# Patient Record
Sex: FEMALE | Race: WHITE | Employment: FULL TIME | URBAN - METROPOLITAN AREA
[De-identification: names, ages, dates, MRNs, and addresses within clinical notes are randomized per-mention and may not be internally consistent; named-entity substitution may affect disease eponyms.]

---

## 2017-02-21 ENCOUNTER — PATIENT OUTREACH (OUTPATIENT)
Dept: FAMILY MEDICINE CLINIC | Facility: CLINIC | Age: 57
End: 2017-02-21

## 2017-02-21 DIAGNOSIS — E11.9 TYPE 2 DIABETES MELLITUS WITHOUT COMPLICATION, WITHOUT LONG-TERM CURRENT USE OF INSULIN (HCC): Primary | ICD-10-CM

## 2017-03-15 ENCOUNTER — OFFICE VISIT (OUTPATIENT)
Dept: FAMILY MEDICINE CLINIC | Facility: CLINIC | Age: 57
End: 2017-03-15

## 2017-03-15 VITALS
DIASTOLIC BLOOD PRESSURE: 80 MMHG | HEART RATE: 94 BPM | TEMPERATURE: 98 F | BODY MASS INDEX: 32.1 KG/M2 | HEIGHT: 61 IN | RESPIRATION RATE: 16 BRPM | SYSTOLIC BLOOD PRESSURE: 120 MMHG | WEIGHT: 170 LBS | OXYGEN SATURATION: 97 %

## 2017-03-15 DIAGNOSIS — M17.12 ARTHRITIS OF LEFT KNEE: Primary | ICD-10-CM

## 2017-03-15 PROCEDURE — 20610 DRAIN/INJ JOINT/BURSA W/O US: CPT | Performed by: FAMILY MEDICINE

## 2017-03-15 PROCEDURE — 99214 OFFICE O/P EST MOD 30 MIN: CPT | Performed by: FAMILY MEDICINE

## 2017-03-15 RX ORDER — NAPROXEN AND ESOMEPRAZOLE MAGNESIUM 500; 20 MG/1; MG/1
500 TABLET, DELAYED RELEASE ORAL 2 TIMES DAILY
Qty: 60 TABLET | Refills: 0 | COMMUNITY
Start: 2017-03-15 | End: 2017-04-14

## 2017-03-15 NOTE — PROGRESS NOTES
Patient presents with:  Imm/Inj       HPI: L knee  has been hurting for few years, worse for last few months. Dull  in character. Radiation to the lower leg . 5-7/10. No weakness. No numbness or tingling. Worsening.  Movement makes it worse and rest makes Surgical History    COLONOSCOPY  5/06; 10/11    Comment internal hemorrhoids    HERNIA SURGERY  [de-identified] years old    Comment left inguinal  repair    HERNIA SURGERY  7/20    Comment umbilical hernia repair    HYSTERECTOMY  6/05    Comment MEENU-BSO    CALCIUM S dx age 62's   • Breast Cancer Paternal Cousin Female 61     approx age   • Breast Cancer Paternal Cousin Female 54        Smoking Status: Never Smoker                      Smokeless Status: Never Used                        Alcohol Use: Yes           0.5 o One  [id number]  [id number]  Patient's status: tolerated well, no complications.   Dressing: Bandagee applied  Post-procedure, patient tolerated it well, no complications, no strenuous activities for one week, f/u in one MONTH            A/P  (M19.90) Ar

## 2017-06-12 RX ORDER — OMEPRAZOLE 40 MG/1
CAPSULE, DELAYED RELEASE ORAL
Qty: 90 CAPSULE | Refills: 1 | Status: SHIPPED | OUTPATIENT
Start: 2017-06-12 | End: 2017-08-19

## 2017-07-07 ENCOUNTER — APPOINTMENT (OUTPATIENT)
Dept: LAB | Age: 57
End: 2017-07-07
Attending: FAMILY MEDICINE
Payer: COMMERCIAL

## 2017-07-07 DIAGNOSIS — E11.9 TYPE 2 DIABETES MELLITUS WITHOUT COMPLICATION, WITHOUT LONG-TERM CURRENT USE OF INSULIN (HCC): ICD-10-CM

## 2017-07-07 LAB
ALBUMIN SERPL-MCNC: 3.5 G/DL (ref 3.5–4.8)
ALP LIVER SERPL-CCNC: 95 U/L (ref 46–118)
ALT SERPL-CCNC: 29 U/L (ref 14–54)
AST SERPL-CCNC: 13 U/L (ref 15–41)
BILIRUB SERPL-MCNC: 0.3 MG/DL (ref 0.1–2)
BUN BLD-MCNC: 26 MG/DL (ref 8–20)
CALCIUM BLD-MCNC: 8.6 MG/DL (ref 8.3–10.3)
CHLORIDE: 105 MMOL/L (ref 101–111)
CHOLEST SMN-MCNC: 220 MG/DL (ref ?–200)
CO2: 27 MMOL/L (ref 22–32)
CREAT BLD-MCNC: 0.82 MG/DL (ref 0.55–1.02)
EST. AVERAGE GLUCOSE BLD GHB EST-MCNC: 154 MG/DL (ref 68–126)
GLUCOSE BLD-MCNC: 150 MG/DL (ref 70–99)
HBA1C MFR BLD HPLC: 7 % (ref ?–5.7)
HDLC SERPL-MCNC: 49 MG/DL (ref 45–?)
HDLC SERPL: 4.49 {RATIO} (ref ?–4.44)
LDLC SERPL CALC-MCNC: 138 MG/DL (ref ?–130)
M PROTEIN MFR SERPL ELPH: 7.2 G/DL (ref 6.1–8.3)
NONHDLC SERPL-MCNC: 171 MG/DL (ref ?–130)
POTASSIUM SERPL-SCNC: 4.2 MMOL/L (ref 3.6–5.1)
SODIUM SERPL-SCNC: 139 MMOL/L (ref 136–144)
TRIGLYCERIDES: 165 MG/DL (ref ?–150)
VLDL: 33 MG/DL (ref 5–40)

## 2017-07-07 PROCEDURE — 80053 COMPREHEN METABOLIC PANEL: CPT

## 2017-07-07 PROCEDURE — 36415 COLL VENOUS BLD VENIPUNCTURE: CPT

## 2017-07-07 PROCEDURE — 83036 HEMOGLOBIN GLYCOSYLATED A1C: CPT

## 2017-07-07 PROCEDURE — 80061 LIPID PANEL: CPT

## 2017-07-10 ENCOUNTER — TELEPHONE (OUTPATIENT)
Dept: FAMILY MEDICINE CLINIC | Facility: CLINIC | Age: 57
End: 2017-07-10

## 2017-07-10 DIAGNOSIS — E11.9 CONTROLLED TYPE 2 DIABETES MELLITUS WITHOUT COMPLICATION, WITHOUT LONG-TERM CURRENT USE OF INSULIN (HCC): Primary | ICD-10-CM

## 2017-07-10 NOTE — TELEPHONE ENCOUNTER
----- Message from Angel Joyce MD sent at 7/9/2017  5:36 PM CDT -----  Sugar is little worse, ok to start Metformin 500mg BID will help the weight and glucose, HgBA1C in 3 months. This is our Chevis Chafe from Radiology.

## 2017-07-10 NOTE — TELEPHONE ENCOUNTER
I called pt at [phone] and verified 2 patient identifiers: name & . I discussed results and recommendations at length with patient. All orders placed. All questions answered.

## 2017-07-13 NOTE — TELEPHONE ENCOUNTER
From: Jazmin Junior  Sent: 7/12/2017 2:09 PM CDT  Subject: Medication Renewal Request    Piotr Maribel.  [place] would like a refill of the following medications:  MetFORMIN HCl 500 MG Oral Tab Gilberto Young MD]    Preferred pharmacy: [facility]AC[facility] #[id number] - [place]

## 2017-07-29 ENCOUNTER — HOSPITAL ENCOUNTER (OUTPATIENT)
Dept: GENERAL RADIOLOGY | Age: 57
Discharge: HOME | End: 2017-07-29
Attending: FAMILY MEDICINE
Payer: COMMERCIAL

## 2017-07-29 ENCOUNTER — OFFICE VISIT (OUTPATIENT)
Dept: FAMILY MEDICINE CLINIC | Facility: CLINIC | Age: 57
End: 2017-07-29

## 2017-07-29 VITALS
RESPIRATION RATE: 18 BRPM | OXYGEN SATURATION: 97 % | BODY MASS INDEX: 32.1 KG/M2 | SYSTOLIC BLOOD PRESSURE: 138 MMHG | DIASTOLIC BLOOD PRESSURE: 88 MMHG | HEART RATE: 84 BPM | HEIGHT: 61 IN | WEIGHT: 170 LBS

## 2017-07-29 DIAGNOSIS — S99.922A TOE INJURY, LEFT, INITIAL ENCOUNTER: ICD-10-CM

## 2017-07-29 DIAGNOSIS — M79.671 RIGHT FOOT PAIN: ICD-10-CM

## 2017-07-29 DIAGNOSIS — E11.9 DIABETES MELLITUS WITHOUT COMPLICATION (HCC): Primary | ICD-10-CM

## 2017-07-29 DIAGNOSIS — M25.561 ACUTE PAIN OF RIGHT KNEE: ICD-10-CM

## 2017-07-29 PROCEDURE — 73630 X-RAY EXAM OF FOOT: CPT | Performed by: FAMILY MEDICINE

## 2017-07-29 PROCEDURE — 73660 X-RAY EXAM OF TOE(S): CPT | Performed by: FAMILY MEDICINE

## 2017-07-29 PROCEDURE — 99214 OFFICE O/P EST MOD 30 MIN: CPT | Performed by: FAMILY MEDICINE

## 2017-07-29 PROCEDURE — 73560 X-RAY EXAM OF KNEE 1 OR 2: CPT | Performed by: FAMILY MEDICINE

## 2017-07-29 RX ORDER — CHLORZOXAZONE 750 MG/1
1 TABLET ORAL NIGHTLY
Qty: 10 TABLET | Refills: 0 | COMMUNITY
Start: 2017-07-29 | End: 2017-08-08

## 2017-07-29 RX ORDER — IBUPROFEN AND FAMOTIDINE 26.6; 8 MG/1; MG/1
1 TABLET, FILM COATED ORAL 2 TIMES DAILY
Qty: 27 TABLET | Refills: 0 | COMMUNITY
Start: 2017-07-29 | End: 2017-08-28

## 2017-07-29 RX ORDER — LANCETS
EACH MISCELLANEOUS
Qty: 100 EACH | Refills: 1 | Status: SHIPPED | OUTPATIENT
Start: 2017-07-29 | End: 2017-08-02

## 2017-07-29 NOTE — PROGRESS NOTES
Patient presents with:  Knee Pain: RT knee pain, worseing over the last month. Foot Pain: RT foot, lateral side pain. DM f/u. HPI: L knee  has been hurting for 21 days . Dull  in character. Radiation to the back  . 8/10. No weakness.   No numbness or ACTINIC KERATOSIS/dysplastic nevi 10/8/2008    Dr. Sapna Garcias follows.    • ANEMIA    • Depression    • Esophageal reflux    • GLUCOSE INTOLERANCE 10/6/2008    HISTORY OF GESTATIONAL DIABETES   • Hashimoto's thyroiditis 1/26/2010   • Internal hemorrhoids withou Hypertension Father    • Diabetes Father    • Stroke Father    • Lipids Father    • Thyroid Disorder Mother      hypothyroidism   • Gastro-Intestinal Disorder Mother      ischemic colitis   • Hypertension Mother    • Diabetes Paternal Grandmother    • Diab No palpable Isidro's cyst., Lachman's test, negative., Anterior drawer test, negative., Posterior drawer test, negative., Lateral collateral ligament intact., Medial collateral ligament intact., No lateral joint line tenderness., No medial joint

## 2017-07-31 ENCOUNTER — TELEPHONE (OUTPATIENT)
Dept: CASE MANAGEMENT | Age: 57
End: 2017-07-31

## 2017-07-31 ENCOUNTER — TELEPHONE (OUTPATIENT)
Dept: FAMILY MEDICINE CLINIC | Facility: CLINIC | Age: 57
End: 2017-07-31

## 2017-07-31 NOTE — TELEPHONE ENCOUNTER
----- Message from Hazel Toro MD sent at 7/31/2017  9:02 AM CDT -----  Body typing, pt has small fracture, ok to see Dr. Harper Carlson for boot if pt wishes.

## 2017-07-31 NOTE — TELEPHONE ENCOUNTER
Spoke to pt regarding her results below. Pt states understanding. Provided information for Dr. Raven Rain.

## 2017-07-31 NOTE — TELEPHONE ENCOUNTER
----- Message from Rox Wade MD sent at 7/31/2017  9:03 AM CDT -----  Small join effusion, no OA, ok to take Duexis.

## 2017-08-02 NOTE — TELEPHONE ENCOUNTER
[facility] and spoke with Thierno Zavala. She states that Ashlyn Lu is not covered under patient's insurance. She is requesting that we change patient's test supplies to One Touch Ultra. Please approve or deny new testing supplies for patient.   Thank yo

## 2017-08-03 RX ORDER — LANCETS 33 GAUGE
EACH MISCELLANEOUS
Qty: 100 EACH | Refills: 3 | Status: ON HOLD | OUTPATIENT
Start: 2017-08-03 | End: 2018-07-27

## 2017-08-03 RX ORDER — BLOOD-GLUCOSE METER
EACH MISCELLANEOUS
Qty: 1 KIT | Refills: 0 | Status: SHIPPED | OUTPATIENT
Start: 2017-08-03

## 2017-08-13 ENCOUNTER — HOSPITAL ENCOUNTER (OUTPATIENT)
Dept: MRI IMAGING | Age: 57
Discharge: HOME | End: 2017-08-13
Attending: FAMILY MEDICINE
Payer: COMMERCIAL

## 2017-08-13 DIAGNOSIS — M25.561 ACUTE PAIN OF RIGHT KNEE: ICD-10-CM

## 2017-08-13 PROCEDURE — 73721 MRI JNT OF LWR EXTRE W/O DYE: CPT | Performed by: FAMILY MEDICINE

## 2017-08-19 NOTE — TELEPHONE ENCOUNTER
LF: 6/12/2017  LOV: 7/29/2017    Pending Prescriptions Disp Refills    OMEPRAZOLE 40 MG Oral Capsule Delayed Release [Pharmacy Med Name: OMEPRAZOLE 40 MG CAPSULE DR] 90 capsule 1     Sig: TAKE ONE CAPSULE BY MOUTH EVERY DAY         Please approve or deny R

## 2017-08-21 RX ORDER — OMEPRAZOLE 40 MG/1
CAPSULE, DELAYED RELEASE ORAL
Qty: 90 CAPSULE | Refills: 1 | Status: SHIPPED | OUTPATIENT
Start: 2017-08-21 | End: 2019-02-06

## 2017-09-06 PROBLEM — M94.261 CHONDROMALACIA OF RIGHT KNEE: Status: ACTIVE | Noted: 2017-09-06

## 2017-09-06 PROBLEM — S83.231A COMPLEX TEAR OF MEDIAL MENISCUS OF RIGHT KNEE AS CURRENT INJURY, INITIAL ENCOUNTER: Status: ACTIVE | Noted: 2017-09-06

## 2017-10-18 PROBLEM — S83.231D COMPLEX TEAR OF MEDIAL MENISCUS OF RIGHT KNEE AS CURRENT INJURY, SUBSEQUENT ENCOUNTER: Status: ACTIVE | Noted: 2017-09-06

## 2017-10-18 PROBLEM — M71.22 POPLITEAL CYST, LEFT: Status: ACTIVE | Noted: 2017-10-18

## 2017-11-02 ENCOUNTER — PATIENT MESSAGE (OUTPATIENT)
Dept: FAMILY MEDICINE CLINIC | Facility: CLINIC | Age: 57
End: 2017-11-02

## 2017-11-02 DIAGNOSIS — Z12.31 ENCOUNTER FOR SCREENING MAMMOGRAM FOR MALIGNANT NEOPLASM OF BREAST: Primary | ICD-10-CM

## 2017-11-03 NOTE — TELEPHONE ENCOUNTER
From: Laurence Pat  To: Leah Bansal MD  Sent: 11/2/2017 11:05 AM CDT  Subject: Other    Will you please put in an order for a screening mammogram for me? Thank you !

## 2017-11-06 ENCOUNTER — HOSPITAL ENCOUNTER (OUTPATIENT)
Dept: MAMMOGRAPHY | Age: 57
Discharge: HOME | End: 2017-11-06
Attending: OBSTETRICS & GYNECOLOGY
Payer: COMMERCIAL

## 2017-11-06 DIAGNOSIS — Z12.31 ENCOUNTER FOR SCREENING MAMMOGRAM FOR MALIGNANT NEOPLASM OF BREAST: ICD-10-CM

## 2017-11-06 PROCEDURE — 77067 SCR MAMMO BI INCL CAD: CPT | Performed by: FAMILY MEDICINE

## 2017-11-11 DIAGNOSIS — E11.9 DIABETES MELLITUS WITHOUT COMPLICATION (HCC): ICD-10-CM

## 2017-12-06 ENCOUNTER — APPOINTMENT (OUTPATIENT)
Dept: LAB | Age: 57
End: 2017-12-06
Attending: FAMILY MEDICINE
Payer: COMMERCIAL

## 2017-12-06 ENCOUNTER — HOSPITAL ENCOUNTER (OUTPATIENT)
Dept: ULTRASOUND IMAGING | Age: 57
Discharge: HOME | End: 2017-12-06
Attending: OTOLARYNGOLOGY
Payer: COMMERCIAL

## 2017-12-06 DIAGNOSIS — E04.2 NONTOXIC MULTINODULAR GOITER: ICD-10-CM

## 2017-12-06 DIAGNOSIS — E11.9 CONTROLLED TYPE 2 DIABETES MELLITUS WITHOUT COMPLICATION, WITHOUT LONG-TERM CURRENT USE OF INSULIN (HCC): ICD-10-CM

## 2017-12-06 PROCEDURE — 36415 COLL VENOUS BLD VENIPUNCTURE: CPT

## 2017-12-06 PROCEDURE — 76536 US EXAM OF HEAD AND NECK: CPT | Performed by: OTOLARYNGOLOGY

## 2017-12-06 PROCEDURE — 83036 HEMOGLOBIN GLYCOSYLATED A1C: CPT

## 2017-12-19 ENCOUNTER — MED REC SCAN ONLY (OUTPATIENT)
Dept: FAMILY MEDICINE CLINIC | Facility: CLINIC | Age: 57
End: 2017-12-19

## 2017-12-19 ENCOUNTER — TELEPHONE (OUTPATIENT)
Dept: FAMILY MEDICINE CLINIC | Facility: CLINIC | Age: 57
End: 2017-12-19

## 2017-12-19 ENCOUNTER — LAB ENCOUNTER (OUTPATIENT)
Dept: LAB | Age: 57
End: 2017-12-19
Attending: FAMILY MEDICINE
Payer: COMMERCIAL

## 2017-12-19 ENCOUNTER — OFFICE VISIT (OUTPATIENT)
Dept: FAMILY MEDICINE CLINIC | Facility: CLINIC | Age: 57
End: 2017-12-19

## 2017-12-19 VITALS
OXYGEN SATURATION: 100 % | SYSTOLIC BLOOD PRESSURE: 120 MMHG | HEART RATE: 78 BPM | WEIGHT: 168 LBS | RESPIRATION RATE: 18 BRPM | HEIGHT: 61 IN | DIASTOLIC BLOOD PRESSURE: 82 MMHG | BODY MASS INDEX: 31.72 KG/M2 | TEMPERATURE: 99 F

## 2017-12-19 DIAGNOSIS — E11.9 TYPE II DIABETES MELLITUS, WELL CONTROLLED (HCC): ICD-10-CM

## 2017-12-19 DIAGNOSIS — Z13.29 SCREENING FOR ENDOCRINE, NUTRITIONAL, METABOLIC AND IMMUNITY DISORDER: ICD-10-CM

## 2017-12-19 DIAGNOSIS — Z00.00 ROUTINE GENERAL MEDICAL EXAMINATION AT A HEALTH CARE FACILITY: Primary | ICD-10-CM

## 2017-12-19 DIAGNOSIS — E11.9 DIABETES MELLITUS WITHOUT COMPLICATION (HCC): ICD-10-CM

## 2017-12-19 DIAGNOSIS — Z13.0 SCREENING FOR ENDOCRINE, NUTRITIONAL, METABOLIC AND IMMUNITY DISORDER: ICD-10-CM

## 2017-12-19 DIAGNOSIS — Z13.228 SCREENING FOR ENDOCRINE, NUTRITIONAL, METABOLIC AND IMMUNITY DISORDER: ICD-10-CM

## 2017-12-19 DIAGNOSIS — Z00.00 ROUTINE GENERAL MEDICAL EXAMINATION AT A HEALTH CARE FACILITY: ICD-10-CM

## 2017-12-19 DIAGNOSIS — Z13.21 SCREENING FOR ENDOCRINE, NUTRITIONAL, METABOLIC AND IMMUNITY DISORDER: ICD-10-CM

## 2017-12-19 DIAGNOSIS — Z23 NEED FOR PNEUMOCOCCAL VACCINATION: ICD-10-CM

## 2017-12-19 PROCEDURE — 90732 PPSV23 VACC 2 YRS+ SUBQ/IM: CPT | Performed by: FAMILY MEDICINE

## 2017-12-19 PROCEDURE — 85025 COMPLETE CBC W/AUTO DIFF WBC: CPT

## 2017-12-19 PROCEDURE — 99396 PREV VISIT EST AGE 40-64: CPT | Performed by: FAMILY MEDICINE

## 2017-12-19 PROCEDURE — 82306 VITAMIN D 25 HYDROXY: CPT

## 2017-12-19 PROCEDURE — 80061 LIPID PANEL: CPT

## 2017-12-19 PROCEDURE — 84443 ASSAY THYROID STIM HORMONE: CPT

## 2017-12-19 PROCEDURE — 90471 IMMUNIZATION ADMIN: CPT | Performed by: FAMILY MEDICINE

## 2017-12-19 PROCEDURE — 80053 COMPREHEN METABOLIC PANEL: CPT

## 2017-12-19 PROCEDURE — 36415 COLL VENOUS BLD VENIPUNCTURE: CPT

## 2017-12-19 RX ORDER — OMEPRAZOLE 40 MG/1
40 CAPSULE, DELAYED RELEASE ORAL 2 TIMES DAILY
Qty: 180 CAPSULE | Refills: 4 | Status: SHIPPED | OUTPATIENT
Start: 2017-12-19 | End: 2018-01-24

## 2017-12-19 RX ORDER — MULTIVIT WITH MINERALS/LUTEIN
1000 TABLET ORAL DAILY
COMMUNITY

## 2017-12-19 RX ORDER — DICLOFENAC SODIUM 75 MG/1
75 TABLET, DELAYED RELEASE ORAL 2 TIMES DAILY
COMMUNITY
End: 2018-05-09 | Stop reason: ALTCHOICE

## 2017-12-19 RX ORDER — ESCITALOPRAM OXALATE 10 MG/1
10 TABLET ORAL DAILY
Qty: 90 TABLET | Refills: 4 | Status: SHIPPED | OUTPATIENT
Start: 2017-12-19 | End: 2018-01-24

## 2017-12-19 NOTE — PROGRESS NOTES
Jose Pradhan is a 61year old female who presents for a complete physical exam, no gyn. HPI:     Patient presents with:  Physical      Patient feels well, dental visit up to date, no hearing problem. Vaccinations up to date.   DM: under good control, p OIL 1000 MG OR CAPS 1 TABLETS TID Disp:  Rfl:    ASPIRIN 81 MG OR TABS 1 TABLET DAILY Disp:  Rfl:    SLOW FE OR 1 DAILY occasional Disp:  Rfl:       Past Medical History:   Diagnosis Date   • ACTINIC KERATOSIS/dysplastic nevi 10/8/2008    Dr. Denise Haas follow Comment: no ischemia  10/6/11: UPPER GI ENDOSCOPY - REFERRAL      Comment: no malignancy, diffuse intramucosal                eosinophils   Family History   Problem Relation Age of Onset   • Hypertension Father    • Diabetes Father    • Stroke Father    • loss  ALLERGY/IMM.: denies food or seasonal allergies  PSYCH: no symptoms of depression or anxiety      EXAM:   /82   Pulse 78   Temp 99 °F (37.2 °C) (Oral)   Resp 18   Ht 61\"   Wt 168 lb   SpO2 100%   BMI 31.74 kg/m²      General: WD/WN in no acute

## 2017-12-19 NOTE — TELEPHONE ENCOUNTER
----- Message from Rox Wade MD sent at 12/19/2017 10:35 AM CST -----  Lets call Dr. Alcides Bryson, she saw him last October for eye exam    [facility]. Dr. Tom Glynn.     Ramona Antunez 64

## 2017-12-19 NOTE — PATIENT INSTRUCTIONS
Constanza Enamorado.  Jasen Mariscal MD ?     Orthopedic Surgeon   Address: [address] [place] [address], [place], [address] [place] [address]   Phone: [phone]

## 2017-12-19 NOTE — TELEPHONE ENCOUNTER
Called Dr. Dail Halsted office and spoke with Charissa Dorado. She advised that patient was seen Sept 2017. Consult note will be faxed to the office.

## 2017-12-21 ENCOUNTER — TELEPHONE (OUTPATIENT)
Dept: FAMILY MEDICINE CLINIC | Facility: CLINIC | Age: 57
End: 2017-12-21

## 2017-12-21 DIAGNOSIS — E78.00 ELEVATED CHOLESTEROL: Primary | ICD-10-CM

## 2017-12-21 RX ORDER — ATORVASTATIN CALCIUM 20 MG/1
20 TABLET, FILM COATED ORAL NIGHTLY
Qty: 30 TABLET | Refills: 6 | Status: SHIPPED | OUTPATIENT
Start: 2017-12-21 | End: 2018-08-06

## 2017-12-21 NOTE — TELEPHONE ENCOUNTER
Spoke with pt regarding results and instructions listed below. Pt expressed understanding. Medication sent to [facility].

## 2017-12-21 NOTE — TELEPHONE ENCOUNTER
----- Message from Hazel Toro MD sent at 12/20/2017 11:41 AM CST -----  Lipids are high, I recommend 20mg of Lipitor at night, 6 refills, low lipid diet, tell pt is free in [place]. otherwise normal. This is our Ramsey from radiology, thank you.

## 2018-02-15 ENCOUNTER — HOSPITAL ENCOUNTER (OUTPATIENT)
Dept: ULTRASOUND IMAGING | Facility: HOSPITAL | Age: 58
Discharge: HOME | End: 2018-02-15
Attending: ORTHOPAEDIC SURGERY
Payer: COMMERCIAL

## 2018-02-15 DIAGNOSIS — M71.22 POPLITEAL CYST, LEFT: ICD-10-CM

## 2018-02-15 PROCEDURE — 20611 DRAIN/INJ JOINT/BURSA W/US: CPT | Performed by: ORTHOPAEDIC SURGERY

## 2018-02-15 PROCEDURE — 76942 ECHO GUIDE FOR BIOPSY: CPT | Performed by: ORTHOPAEDIC SURGERY

## 2018-02-15 PROCEDURE — 20610 DRAIN/INJ JOINT/BURSA W/O US: CPT | Performed by: ORTHOPAEDIC SURGERY

## 2018-02-15 RX ORDER — METHYLPREDNISOLONE ACETATE 40 MG/ML
INJECTION, SUSPENSION INTRA-ARTICULAR; INTRALESIONAL; INTRAMUSCULAR; SOFT TISSUE
Status: DISPENSED
Start: 2018-02-15 | End: 2018-02-15

## 2018-02-15 NOTE — PROCEDURES
PROCEDURE: US ASPIRATION/INJECTION MAJOR JOINT INCLD IMAG LEFT (CPT=20611)    COMPARISON: None. INDICATIONS: M71.22 Synovial cyst of popliteal space ([place]), left knee    DESCRIPTION: Witnessed verbal and written informed consent was obtained.  Time out

## 2018-02-21 ENCOUNTER — PATIENT OUTREACH (OUTPATIENT)
Dept: FAMILY MEDICINE CLINIC | Facility: CLINIC | Age: 58
End: 2018-02-21

## 2018-02-21 ENCOUNTER — OFFICE VISIT (OUTPATIENT)
Dept: OTHER | Age: 58
End: 2018-02-21
Attending: FAMILY MEDICINE
Payer: OTHER MISCELLANEOUS

## 2018-02-21 ENCOUNTER — HOSPITAL ENCOUNTER (OUTPATIENT)
Dept: GENERAL RADIOLOGY | Age: 58
Discharge: HOME | End: 2018-02-21
Attending: FAMILY MEDICINE
Payer: OTHER MISCELLANEOUS

## 2018-02-21 DIAGNOSIS — E11.9 TYPE II DIABETES MELLITUS, WELL CONTROLLED (HCC): Primary | ICD-10-CM

## 2018-02-21 DIAGNOSIS — S89.92XA LEFT KNEE INJURY, INITIAL ENCOUNTER: Primary | ICD-10-CM

## 2018-02-21 DIAGNOSIS — S89.92XA LEFT KNEE INJURY, INITIAL ENCOUNTER: ICD-10-CM

## 2018-02-21 PROCEDURE — 73562 X-RAY EXAM OF KNEE 3: CPT | Performed by: FAMILY MEDICINE

## 2018-02-21 RX ORDER — IBUPROFEN 600 MG/1
600 TABLET ORAL EVERY 6 HOURS PRN
Qty: 30 TABLET | Refills: 0 | Status: SHIPPED | OUTPATIENT
Start: 2018-02-21 | End: 2018-03-23

## 2018-02-28 ENCOUNTER — OFFICE VISIT (OUTPATIENT)
Dept: OTHER | Age: 58
End: 2018-02-28
Attending: FAMILY MEDICINE
Payer: OTHER MISCELLANEOUS

## 2018-02-28 DIAGNOSIS — S16.1XXA NECK STRAIN, INITIAL ENCOUNTER: Primary | ICD-10-CM

## 2018-02-28 RX ORDER — CYCLOBENZAPRINE HCL 5 MG
5 TABLET ORAL NIGHTLY
Qty: 30 TABLET | Refills: 0 | Status: SHIPPED | OUTPATIENT
Start: 2018-02-28 | End: 2018-03-30

## 2018-03-07 ENCOUNTER — APPOINTMENT (OUTPATIENT)
Dept: OTHER | Age: 58
End: 2018-03-07
Attending: FAMILY MEDICINE
Payer: OTHER MISCELLANEOUS

## 2018-04-02 ENCOUNTER — MED REC SCAN ONLY (OUTPATIENT)
Dept: FAMILY MEDICINE CLINIC | Facility: CLINIC | Age: 58
End: 2018-04-02

## 2018-04-09 ENCOUNTER — NURSE ONLY (OUTPATIENT)
Dept: FAMILY MEDICINE CLINIC | Facility: CLINIC | Age: 58
End: 2018-04-09

## 2018-04-09 DIAGNOSIS — Z23 NEED FOR TETANUS, DIPHTHERIA, AND ACELLULAR PERTUSSIS (TDAP) VACCINE: Primary | ICD-10-CM

## 2018-04-09 PROCEDURE — 90715 TDAP VACCINE 7 YRS/> IM: CPT | Performed by: FAMILY MEDICINE

## 2018-04-09 PROCEDURE — 90471 IMMUNIZATION ADMIN: CPT | Performed by: FAMILY MEDICINE

## 2018-05-09 PROBLEM — M23.92 INTERNAL DERANGEMENT OF KNEE, LEFT: Status: ACTIVE | Noted: 2018-05-09

## 2018-05-23 ENCOUNTER — HOSPITAL ENCOUNTER (OUTPATIENT)
Dept: MRI IMAGING | Age: 58
Discharge: HOME | End: 2018-05-23
Attending: ORTHOPAEDIC SURGERY
Payer: COMMERCIAL

## 2018-05-23 DIAGNOSIS — M23.92 INTERNAL DERANGEMENT OF KNEE, LEFT: ICD-10-CM

## 2018-05-23 PROCEDURE — 73721 MRI JNT OF LWR EXTRE W/O DYE: CPT | Performed by: ORTHOPAEDIC SURGERY

## 2018-06-05 ENCOUNTER — TELEPHONE (OUTPATIENT)
Dept: FAMILY MEDICINE CLINIC | Facility: CLINIC | Age: 58
End: 2018-06-05

## 2018-06-05 DIAGNOSIS — Z12.31 BREAST CANCER SCREENING BY MAMMOGRAM: Primary | ICD-10-CM

## 2018-06-14 ENCOUNTER — HOSPITAL ENCOUNTER (OUTPATIENT)
Dept: MAMMOGRAPHY | Age: 58
Discharge: HOME | End: 2018-06-14
Attending: FAMILY MEDICINE
Payer: COMMERCIAL

## 2018-06-14 DIAGNOSIS — Z12.31 BREAST CANCER SCREENING BY MAMMOGRAM: ICD-10-CM

## 2018-06-14 PROCEDURE — 77063 BREAST TOMOSYNTHESIS BI: CPT | Performed by: FAMILY MEDICINE

## 2018-06-14 PROCEDURE — 77067 SCR MAMMO BI INCL CAD: CPT | Performed by: FAMILY MEDICINE

## 2018-06-15 ENCOUNTER — TELEPHONE (OUTPATIENT)
Dept: FAMILY MEDICINE CLINIC | Facility: CLINIC | Age: 58
End: 2018-06-15

## 2018-06-15 NOTE — TELEPHONE ENCOUNTER
----- Message from Jeff Richards MD sent at 6/14/2018  2:35 PM CDT -----  This is our mammography tech. Ok to do labs for DM and ask for eye appt, thanks.

## 2018-06-18 ENCOUNTER — TELEPHONE (OUTPATIENT)
Dept: FAMILY MEDICINE CLINIC | Facility: CLINIC | Age: 58
End: 2018-06-18

## 2018-06-18 NOTE — TELEPHONE ENCOUNTER
Patient is having breast reduction on 7/30/18 [facility] by [place]. John Weaver. Pink sheet filled out.

## 2018-06-29 ENCOUNTER — TELEPHONE (OUTPATIENT)
Dept: FAMILY MEDICINE CLINIC | Facility: CLINIC | Age: 58
End: 2018-06-29

## 2018-06-29 DIAGNOSIS — Z01.818 PRE-OP TESTING: Primary | ICD-10-CM

## 2018-07-02 ENCOUNTER — OFFICE VISIT (OUTPATIENT)
Dept: FAMILY MEDICINE CLINIC | Facility: CLINIC | Age: 58
End: 2018-07-02

## 2018-07-02 VITALS
RESPIRATION RATE: 20 BRPM | HEIGHT: 61.5 IN | WEIGHT: 163 LBS | OXYGEN SATURATION: 99 % | HEART RATE: 72 BPM | DIASTOLIC BLOOD PRESSURE: 80 MMHG | TEMPERATURE: 99 F | BODY MASS INDEX: 30.38 KG/M2 | SYSTOLIC BLOOD PRESSURE: 122 MMHG

## 2018-07-02 DIAGNOSIS — Z01.818 PREOP EXAMINATION: Primary | ICD-10-CM

## 2018-07-02 DIAGNOSIS — I05.9 MITRAL VALVE DISEASE: ICD-10-CM

## 2018-07-02 DIAGNOSIS — E11.9 TYPE II DIABETES MELLITUS, WELL CONTROLLED (HCC): ICD-10-CM

## 2018-07-02 DIAGNOSIS — Z13.21 ENCOUNTER FOR VITAMIN DEFICIENCY SCREENING: ICD-10-CM

## 2018-07-02 DIAGNOSIS — M54.9 MID BACK PAIN: ICD-10-CM

## 2018-07-02 DIAGNOSIS — M54.2 CERVICALGIA: ICD-10-CM

## 2018-07-02 PROCEDURE — 93000 ELECTROCARDIOGRAM COMPLETE: CPT | Performed by: FAMILY MEDICINE

## 2018-07-02 PROCEDURE — 99244 OFF/OP CNSLTJ NEW/EST MOD 40: CPT | Performed by: FAMILY MEDICINE

## 2018-07-02 NOTE — PROGRESS NOTES
CC: Preop exam.      HPI:  Nabeel Taylor is a 61year old female who presents for a pre-operative physical exam  By Dr. Eric zelaya. Patient is to have bilateral breast reduction   ,secondary to  breast enlargement and shoulders pain  to be on  7/3 Rfl:    SLOW FE OR 1 DAILY occasional Disp:  Rfl:       Allergies:   Msg [Monosodium Glu*    Tightness in Throat  Adhesive Tape           RASH   Past Medical History:   Diagnosis Date   • ACTINIC KERATOSIS/dysplastic nevi 10/8/2008    Dr. Joey Serna follows. ischemia  10/6/11: UPPER GI ENDOSCOPY - REFERRAL      Comment: no malignancy, diffuse intramucosal                eosinophils   Family History   Problem Relation Age of Onset   • Hypertension Father    • Diabetes Father    • Stroke Father    • Lipids Fathe lb   SpO2 99%   BMI 30.30 kg/m²   GENERAL: well developed, well nourished,in no apparent distress  SKIN: no rashes,no suspicious lesions  HEENT: atraumatic, normocephalic,ears and throat are clear  EYES:PERRLA, EOMI, normal optic disk,conjunctiva are clear

## 2018-07-03 ENCOUNTER — TELEPHONE (OUTPATIENT)
Dept: FAMILY MEDICINE CLINIC | Facility: CLINIC | Age: 58
End: 2018-07-03

## 2018-07-03 ENCOUNTER — LAB ENCOUNTER (OUTPATIENT)
Dept: LAB | Age: 58
End: 2018-07-03
Attending: FAMILY MEDICINE
Payer: COMMERCIAL

## 2018-07-03 DIAGNOSIS — E78.00 ELEVATED CHOLESTEROL: ICD-10-CM

## 2018-07-03 DIAGNOSIS — Z01.818 PRE-OP TESTING: ICD-10-CM

## 2018-07-03 DIAGNOSIS — Z13.21 ENCOUNTER FOR VITAMIN DEFICIENCY SCREENING: ICD-10-CM

## 2018-07-03 DIAGNOSIS — E11.9 TYPE II DIABETES MELLITUS, WELL CONTROLLED (HCC): ICD-10-CM

## 2018-07-03 LAB
25-HYDROXYVITAMIN D (TOTAL): 77.3 NG/ML (ref 30–100)
ALBUMIN SERPL-MCNC: 3.7 G/DL (ref 3.5–4.8)
ALP LIVER SERPL-CCNC: 82 U/L (ref 46–118)
ALT SERPL-CCNC: 26 U/L (ref 14–54)
AST SERPL-CCNC: 15 U/L (ref 15–41)
BASOPHILS # BLD AUTO: 0.02 X10(3) UL (ref 0–0.1)
BASOPHILS NFR BLD AUTO: 0.3 %
BILIRUB SERPL-MCNC: 0.3 MG/DL (ref 0.1–2)
BUN BLD-MCNC: 27 MG/DL (ref 8–20)
CALCIUM BLD-MCNC: 8.8 MG/DL (ref 8.3–10.3)
CHLORIDE: 108 MMOL/L (ref 101–111)
CHOLEST SMN-MCNC: 159 MG/DL (ref ?–200)
CO2: 28 MMOL/L (ref 22–32)
CREAT BLD-MCNC: 0.82 MG/DL (ref 0.55–1.02)
CREAT UR-SCNC: 127 MG/DL
EOSINOPHIL # BLD AUTO: 0.17 X10(3) UL (ref 0–0.3)
EOSINOPHIL NFR BLD AUTO: 2.6 %
ERYTHROCYTE [DISTWIDTH] IN BLOOD BY AUTOMATED COUNT: 12.5 % (ref 11.5–16)
EST. AVERAGE GLUCOSE BLD GHB EST-MCNC: 140 MG/DL (ref 68–126)
GLUCOSE BLD-MCNC: 120 MG/DL (ref 70–99)
HBA1C MFR BLD HPLC: 6.5 % (ref ?–5.7)
HCT VFR BLD AUTO: 39.8 % (ref 34–50)
HDLC SERPL-MCNC: 43 MG/DL (ref 45–?)
HDLC SERPL: 3.7 {RATIO} (ref ?–4.44)
HGB BLD-MCNC: 13 G/DL (ref 12–16)
IMMATURE GRANULOCYTE COUNT: 0.01 X10(3) UL (ref 0–1)
IMMATURE GRANULOCYTE RATIO %: 0.2 %
LDLC SERPL CALC-MCNC: 99 MG/DL (ref ?–130)
LYMPHOCYTES # BLD AUTO: 2.52 X10(3) UL (ref 0.9–4)
LYMPHOCYTES NFR BLD AUTO: 39.1 %
M PROTEIN MFR SERPL ELPH: 7.1 G/DL (ref 6.1–8.3)
MCH RBC QN AUTO: 31.5 PG (ref 27–33.2)
MCHC RBC AUTO-ENTMCNC: 32.7 G/DL (ref 31–37)
MCV RBC AUTO: 96.4 FL (ref 81–100)
MICROALBUMIN UR-MCNC: 0.62 MG/DL
MICROALBUMIN/CREAT 24H UR-RTO: 4.9 UG/MG (ref ?–30)
MONOCYTES # BLD AUTO: 0.48 X10(3) UL (ref 0.1–1)
MONOCYTES NFR BLD AUTO: 7.4 %
NEUTROPHIL ABS PRELIM: 3.25 X10 (3) UL (ref 1.3–6.7)
NEUTROPHILS # BLD AUTO: 3.25 X10(3) UL (ref 1.3–6.7)
NEUTROPHILS NFR BLD AUTO: 50.4 %
NONHDLC SERPL-MCNC: 116 MG/DL (ref ?–130)
PLATELET # BLD AUTO: 279 10(3)UL (ref 150–450)
POTASSIUM SERPL-SCNC: 4.1 MMOL/L (ref 3.6–5.1)
RBC # BLD AUTO: 4.13 X10(6)UL (ref 3.8–5.1)
RED CELL DISTRIBUTION WIDTH-SD: 43.9 FL (ref 35.1–46.3)
SODIUM SERPL-SCNC: 143 MMOL/L (ref 136–144)
TRIGL SERPL-MCNC: 87 MG/DL (ref ?–150)
VLDLC SERPL CALC-MCNC: 17 MG/DL (ref 5–40)
WBC # BLD AUTO: 6.5 X10(3) UL (ref 4–13)

## 2018-07-03 PROCEDURE — 80061 LIPID PANEL: CPT

## 2018-07-03 PROCEDURE — 82306 VITAMIN D 25 HYDROXY: CPT

## 2018-07-03 PROCEDURE — 36415 COLL VENOUS BLD VENIPUNCTURE: CPT

## 2018-07-03 PROCEDURE — 83036 HEMOGLOBIN GLYCOSYLATED A1C: CPT

## 2018-07-03 PROCEDURE — 82570 ASSAY OF URINE CREATININE: CPT

## 2018-07-03 PROCEDURE — 80053 COMPREHEN METABOLIC PANEL: CPT

## 2018-07-03 PROCEDURE — 85025 COMPLETE CBC W/AUTO DIFF WBC: CPT

## 2018-07-03 PROCEDURE — 82043 UR ALBUMIN QUANTITATIVE: CPT

## 2018-07-17 ENCOUNTER — MED REC SCAN ONLY (OUTPATIENT)
Dept: FAMILY MEDICINE CLINIC | Facility: CLINIC | Age: 58
End: 2018-07-17

## 2018-07-25 ENCOUNTER — ANESTHESIA EVENT (OUTPATIENT)
Dept: SURGERY | Facility: HOSPITAL | Age: 58
End: 2018-07-25

## 2018-07-29 NOTE — ANESTHESIA PREPROCEDURE EVALUATION
PRE-OP EVALUATION    Patient Name: Marino Mixon    Pre-op Diagnosis: BILATERAL BREAST HYPERTROPHY      Procedure(s):  BILATERAL BREAST REDUCTION      Surgeon(s) and Role:     Laurann Closs, MD - Primary    Pre-op vitals reviewed.         Body mass inde Cardiovascular      ECG reviewed.   Exercise tolerance: good     MET: >4         (+) hyperlipidemia          (+) valvular problems/murmurs and MR                       Endo/Other      (+) diabetes                            Pulmonary LOCALIZATION W/ SPECIMEN 1 SITE LEFT      Comment: AGE 22 LIPOMA  2012: NEEDLE BIOPSY LEFT      Comment: ADH  No date: OTHER      Comment: lipoma removed age 20  8/05: OTHER      Comment: rectocele, repair of prolapsed vagina  6/05: OTHER SURGICAL HISTORY

## 2018-07-30 ENCOUNTER — HOSPITAL ENCOUNTER (OUTPATIENT)
Facility: HOSPITAL | Age: 58
Setting detail: OBSERVATION
Discharge: HOME | End: 2018-07-31
Attending: SURGERY | Admitting: SURGERY
Payer: COMMERCIAL

## 2018-07-30 ENCOUNTER — ANESTHESIA (OUTPATIENT)
Dept: SURGERY | Facility: HOSPITAL | Age: 58
End: 2018-07-30

## 2018-07-30 PROBLEM — E11.69 DIABETES MELLITUS TYPE 2 IN OBESE (HCC): Status: ACTIVE | Noted: 2017-12-19

## 2018-07-30 PROBLEM — E66.9 DIABETES MELLITUS TYPE 2 IN OBESE  (HCC): Status: ACTIVE | Noted: 2017-12-19

## 2018-07-30 PROBLEM — E11.69 DIABETES MELLITUS TYPE 2 IN OBESE  (HCC): Status: ACTIVE | Noted: 2017-12-19

## 2018-07-30 PROBLEM — E66.9 DIABETES MELLITUS TYPE 2 IN OBESE (HCC): Status: ACTIVE | Noted: 2017-12-19

## 2018-07-30 PROBLEM — E66.9 DIABETES MELLITUS TYPE 2 IN OBESE: Status: ACTIVE | Noted: 2017-12-19

## 2018-07-30 PROBLEM — E11.69 DIABETES MELLITUS TYPE 2 IN OBESE: Status: ACTIVE | Noted: 2017-12-19

## 2018-07-30 LAB
EST. AVERAGE GLUCOSE BLD GHB EST-MCNC: 143 MG/DL (ref 68–126)
GLUCOSE BLD-MCNC: 124 MG/DL (ref 65–99)
GLUCOSE BLD-MCNC: 148 MG/DL (ref 65–99)
GLUCOSE BLD-MCNC: 204 MG/DL (ref 65–99)
HBA1C MFR BLD HPLC: 6.6 % (ref ?–5.7)

## 2018-07-30 PROCEDURE — 0HBV0ZZ EXCISION OF BILATERAL BREAST, OPEN APPROACH: ICD-10-PCS | Performed by: SURGERY

## 2018-07-30 PROCEDURE — 99226 SUBSEQUENT OBSERVATION CARE: CPT | Performed by: HOSPITALIST

## 2018-07-30 RX ORDER — ONDANSETRON 2 MG/ML
4 INJECTION INTRAMUSCULAR; INTRAVENOUS EVERY 6 HOURS PRN
Status: ACTIVE | OUTPATIENT
Start: 2018-07-30 | End: 2018-07-31

## 2018-07-30 RX ORDER — CEFAZOLIN SODIUM/WATER 2 G/20 ML
2 SYRINGE (ML) INTRAVENOUS ONCE
Status: COMPLETED | OUTPATIENT
Start: 2018-07-30 | End: 2018-07-30

## 2018-07-30 RX ORDER — DEXTROSE MONOHYDRATE 25 G/50ML
50 INJECTION, SOLUTION INTRAVENOUS
Status: DISCONTINUED | OUTPATIENT
Start: 2018-07-30 | End: 2018-07-30 | Stop reason: HOSPADM

## 2018-07-30 RX ORDER — HYDROCODONE BITARTRATE AND ACETAMINOPHEN 5; 325 MG/1; MG/1
1 TABLET ORAL EVERY 6 HOURS PRN
Status: DISCONTINUED | OUTPATIENT
Start: 2018-07-30 | End: 2018-07-31

## 2018-07-30 RX ORDER — ACETAMINOPHEN 500 MG
1000 TABLET ORAL ONCE
COMMUNITY

## 2018-07-30 RX ORDER — ACETAMINOPHEN 500 MG
1000 TABLET ORAL ONCE
Status: DISCONTINUED | OUTPATIENT
Start: 2018-07-30 | End: 2018-07-30 | Stop reason: HOSPADM

## 2018-07-30 RX ORDER — DEXTROSE MONOHYDRATE 25 G/50ML
50 INJECTION, SOLUTION INTRAVENOUS
Status: DISCONTINUED | OUTPATIENT
Start: 2018-07-30 | End: 2018-07-31

## 2018-07-30 RX ORDER — MORPHINE SULFATE 4 MG/ML
1 INJECTION, SOLUTION INTRAMUSCULAR; INTRAVENOUS EVERY 2 HOUR PRN
Status: DISCONTINUED | OUTPATIENT
Start: 2018-07-30 | End: 2018-07-31

## 2018-07-30 RX ORDER — INSULIN ASPART 100 [IU]/ML
INJECTION, SOLUTION INTRAVENOUS; SUBCUTANEOUS ONCE
Status: DISCONTINUED | OUTPATIENT
Start: 2018-07-30 | End: 2018-07-30 | Stop reason: HOSPADM

## 2018-07-30 RX ORDER — NALOXONE HYDROCHLORIDE 0.4 MG/ML
80 INJECTION, SOLUTION INTRAMUSCULAR; INTRAVENOUS; SUBCUTANEOUS AS NEEDED
Status: DISCONTINUED | OUTPATIENT
Start: 2018-07-30 | End: 2018-07-30 | Stop reason: HOSPADM

## 2018-07-30 RX ORDER — SODIUM CHLORIDE, SODIUM LACTATE, POTASSIUM CHLORIDE, CALCIUM CHLORIDE 600; 310; 30; 20 MG/100ML; MG/100ML; MG/100ML; MG/100ML
INJECTION, SOLUTION INTRAVENOUS CONTINUOUS
Status: DISCONTINUED | OUTPATIENT
Start: 2018-07-30 | End: 2018-07-30 | Stop reason: HOSPADM

## 2018-07-30 RX ORDER — LABETALOL HYDROCHLORIDE 5 MG/ML
5 INJECTION, SOLUTION INTRAVENOUS EVERY 5 MIN PRN
Status: DISCONTINUED | OUTPATIENT
Start: 2018-07-30 | End: 2018-07-30 | Stop reason: HOSPADM

## 2018-07-30 RX ORDER — ONDANSETRON 2 MG/ML
4 INJECTION INTRAMUSCULAR; INTRAVENOUS AS NEEDED
Status: DISCONTINUED | OUTPATIENT
Start: 2018-07-30 | End: 2018-07-30 | Stop reason: HOSPADM

## 2018-07-30 RX ORDER — ESCITALOPRAM OXALATE 10 MG/1
10 TABLET ORAL DAILY
Status: DISCONTINUED | OUTPATIENT
Start: 2018-07-30 | End: 2018-07-31

## 2018-07-30 RX ORDER — MEPERIDINE HYDROCHLORIDE 25 MG/ML
12.5 INJECTION INTRAMUSCULAR; INTRAVENOUS; SUBCUTANEOUS AS NEEDED
Status: DISCONTINUED | OUTPATIENT
Start: 2018-07-30 | End: 2018-07-30 | Stop reason: HOSPADM

## 2018-07-30 RX ORDER — DEXTROSE, SODIUM CHLORIDE, SODIUM LACTATE, POTASSIUM CHLORIDE, AND CALCIUM CHLORIDE 5; .6; .31; .03; .02 G/100ML; G/100ML; G/100ML; G/100ML; G/100ML
INJECTION, SOLUTION INTRAVENOUS CONTINUOUS
Status: DISCONTINUED | OUTPATIENT
Start: 2018-07-30 | End: 2018-07-31

## 2018-07-30 RX ORDER — SODIUM CHLORIDE, SODIUM LACTATE, POTASSIUM CHLORIDE, CALCIUM CHLORIDE 600; 310; 30; 20 MG/100ML; MG/100ML; MG/100ML; MG/100ML
INJECTION, SOLUTION INTRAVENOUS CONTINUOUS
Status: DISCONTINUED | OUTPATIENT
Start: 2018-07-30 | End: 2018-07-30

## 2018-07-30 RX ORDER — CEFAZOLIN SODIUM/WATER 2 G/20 ML
2 SYRINGE (ML) INTRAVENOUS EVERY 8 HOURS
Status: COMPLETED | OUTPATIENT
Start: 2018-07-30 | End: 2018-07-30

## 2018-07-30 RX ORDER — ACETAMINOPHEN 325 MG/1
650 TABLET ORAL EVERY 4 HOURS PRN
Status: DISCONTINUED | OUTPATIENT
Start: 2018-07-30 | End: 2018-07-31

## 2018-07-30 RX ORDER — MORPHINE SULFATE 4 MG/ML
1.5 INJECTION, SOLUTION INTRAMUSCULAR; INTRAVENOUS EVERY 2 HOUR PRN
Status: DISCONTINUED | OUTPATIENT
Start: 2018-07-30 | End: 2018-07-31

## 2018-07-30 RX ORDER — PANTOPRAZOLE SODIUM 40 MG/1
40 TABLET, DELAYED RELEASE ORAL
Status: DISCONTINUED | OUTPATIENT
Start: 2018-07-31 | End: 2018-07-31

## 2018-07-30 RX ORDER — HYDROMORPHONE HYDROCHLORIDE 1 MG/ML
1 INJECTION, SOLUTION INTRAMUSCULAR; INTRAVENOUS; SUBCUTANEOUS
Status: DISCONTINUED | OUTPATIENT
Start: 2018-07-30 | End: 2018-07-30 | Stop reason: RX

## 2018-07-30 RX ORDER — METOCLOPRAMIDE HYDROCHLORIDE 5 MG/ML
10 INJECTION INTRAMUSCULAR; INTRAVENOUS EVERY 6 HOURS PRN
Status: DISCONTINUED | OUTPATIENT
Start: 2018-07-30 | End: 2018-07-31

## 2018-07-30 RX ORDER — MIDAZOLAM HYDROCHLORIDE 1 MG/ML
1 INJECTION INTRAMUSCULAR; INTRAVENOUS EVERY 5 MIN PRN
Status: DISCONTINUED | OUTPATIENT
Start: 2018-07-30 | End: 2018-07-30 | Stop reason: HOSPADM

## 2018-07-30 RX ORDER — LANCETS 33 GAUGE
EACH MISCELLANEOUS
Qty: 100 EACH | Refills: 3 | Status: SHIPPED | OUTPATIENT
Start: 2018-07-30

## 2018-07-30 RX ORDER — METOCLOPRAMIDE HYDROCHLORIDE 5 MG/ML
10 INJECTION INTRAMUSCULAR; INTRAVENOUS AS NEEDED
Status: DISCONTINUED | OUTPATIENT
Start: 2018-07-30 | End: 2018-07-30 | Stop reason: HOSPADM

## 2018-07-30 RX ORDER — HYDROCODONE BITARTRATE AND ACETAMINOPHEN 5; 325 MG/1; MG/1
2 TABLET ORAL AS NEEDED
Status: DISCONTINUED | OUTPATIENT
Start: 2018-07-30 | End: 2018-07-30 | Stop reason: HOSPADM

## 2018-07-30 RX ORDER — BACITRACIN 50000 [USP'U]/1
INJECTION, POWDER, LYOPHILIZED, FOR SOLUTION INTRAMUSCULAR AS NEEDED
Status: DISCONTINUED | OUTPATIENT
Start: 2018-07-30 | End: 2018-07-30 | Stop reason: HOSPADM

## 2018-07-30 RX ORDER — ATORVASTATIN CALCIUM 20 MG/1
20 TABLET, FILM COATED ORAL NIGHTLY
Status: DISCONTINUED | OUTPATIENT
Start: 2018-07-30 | End: 2018-07-31

## 2018-07-30 RX ORDER — MORPHINE SULFATE 4 MG/ML
2 INJECTION, SOLUTION INTRAMUSCULAR; INTRAVENOUS EVERY 2 HOUR PRN
Status: DISCONTINUED | OUTPATIENT
Start: 2018-07-30 | End: 2018-07-31

## 2018-07-30 RX ORDER — MORPHINE SULFATE 4 MG/ML
2 INJECTION, SOLUTION INTRAMUSCULAR; INTRAVENOUS EVERY 5 MIN PRN
Status: DISCONTINUED | OUTPATIENT
Start: 2018-07-30 | End: 2018-07-30 | Stop reason: HOSPADM

## 2018-07-30 RX ORDER — HYDROCODONE BITARTRATE AND ACETAMINOPHEN 5; 325 MG/1; MG/1
1 TABLET ORAL AS NEEDED
Status: DISCONTINUED | OUTPATIENT
Start: 2018-07-30 | End: 2018-07-30 | Stop reason: HOSPADM

## 2018-07-30 NOTE — CONSULTS
[facility]  [facility] Patient Status:  Outpatient in a Bed    1960 MRN [id number]   [facility] 3NW-A Attending Teodora Mccall MD   Hosp Day # 0 PCP Keaton Salazar MD     Reason for consult: medical managem Comment: MEENU-BSO  5/2012: LUMPECTOMY LEFT      Comment: ADH  No date: [facility] NEEDLE LOCALIZATION W/ SPECIMEN 1 SITE LEFT      Comment: AGE 18 LIPOMA  2012: NEEDLE BIOPSY LEFT      Comment: ADH  No date: OTHER      Comment: lipoma removed age 20  8/05: OTHER prior to encounter. Current Outpatient Prescriptions on File Prior to Encounter:  atorvastatin 20 MG Oral Tab Take 1 tablet (20 mg total) by mouth nightly. Disp: 30 tablet Rfl: 6   Ascorbic Acid (VITAMIN C) 1000 MG Oral Tab Take 1,000 mg by mouth daily. incisions  Abdomen: Soft, nontender, nondistended. Positive bowel sounds. No rebound, guarding or organomegaly. Neurologic: No focal neurological deficits. CNII-XII grossly intact. Musculoskeletal: Moves all extremities.   Extremities: No edema or cyanos

## 2018-07-30 NOTE — PROGRESS NOTES
Dilaudid is on nationwide backorder and restricted to patients who have morphine allergies. Dilaudid has been discontinued per protocol for Brittney Cross per protocol.      Thanks, Kevin Varghese Pharm D [id number]

## 2018-07-30 NOTE — H&P
History & Physical Examination    Patient Name: Rosemary Leggett  MRN: [id number]  [facility]: [id number]  YOB: 1960    Diagnosis: Breast Hypertrophy    Present Illness: Same, for bilateral breast reduction      Prescriptions Prior to Admission:  acetam Facility-Administered Medications:  lactated ringers infusion  Intravenous Continuous   acetaminophen (TYLENOL EXTRA STRENGTH) tab 1,000 mg 1,000 mg Oral Once   glucose (DEX4) oral liquid 15 g 15 g Oral Q15 Min PRN   Or      Glucose-Vitamin C (DEX-4) 4-0.0 MEENU-BSO  5/2012: LUMPECTOMY LEFT      Comment: ADH  No date: [facility] NEEDLE LOCALIZATION W/ SPECIMEN 1 SITE LEFT      Comment: AGE 18 LIPOMA  2012: NEEDLE BIOPSY LEFT      Comment: ADH  No date: OTHER      Comment: lipoma removed age 20  8/05: Jesse [x ]    OTHER   Bilateral breast hypertrophy with no masses     [ x ] I have discussed the risks and benefits and alternatives with the patient/family. They understand and agree to proceed with plan of care.   [ x ] I have reviewed the History and Physical

## 2018-07-30 NOTE — PROGRESS NOTES
Patient resting calmly in bed. VSS afebrile. Denies nausea or emesis. Patient states pain rated 4/10 and declines pain medication at this time. Note chest dressing clean, dry and intact with bilateral BLAKE drains intact draining serosanguinous drainage.

## 2018-07-30 NOTE — ANESTHESIA POSTPROCEDURE EVALUATION
[address] [address] Patient Status:  Outpatient in a Bed   Age/Gender 61year old female MRN [id number]   [facility] SURGERY Attending Saji Perez MD   Hosp Day # 0 PCP Familia Mayorga MD       Anesthesia Post-op Note    Pro

## 2018-07-30 NOTE — TELEPHONE ENCOUNTER
From: Naomi Joseph  Sent: 7/27/2018 3:28 PM CDT  Subject: Medication Renewal Request    Yovany Cross would like a refill of the following medications:     Glucose Blood (ONETOUCH ULTRA BLUE) In Vitro Strip [Hui Francis MD]     [facility]

## 2018-07-31 VITALS
RESPIRATION RATE: 14 BRPM | TEMPERATURE: 98 F | DIASTOLIC BLOOD PRESSURE: 60 MMHG | WEIGHT: 163 LBS | HEIGHT: 61.5 IN | HEART RATE: 81 BPM | SYSTOLIC BLOOD PRESSURE: 121 MMHG | OXYGEN SATURATION: 96 % | BODY MASS INDEX: 30.38 KG/M2

## 2018-07-31 PROBLEM — N62 BREAST HYPERTROPHY: Status: ACTIVE | Noted: 2018-07-31

## 2018-07-31 LAB
GLUCOSE BLD-MCNC: 141 MG/DL (ref 65–99)
GLUCOSE BLD-MCNC: 222 MG/DL (ref 65–99)

## 2018-07-31 PROCEDURE — 99225 SUBSEQUENT OBSERVATION CARE: CPT | Performed by: HOSPITALIST

## 2018-07-31 NOTE — PROGRESS NOTES
[facility]OSPITAL[facility]  Progress Note     Rachana Barnett Patient Status:  Observation    1960 MRN [id number]   [facility] 3NW-A Attending Flori Velásquez MD   Hosp Day # 0 PCP Cadence Joshi MD     Chief Complaint: med mgmt    S: Patient Thank you for the consultation    Quality:  · DVT Prophylaxis: SCD  · CODE status: full  · Lopez: no  · Central line: no    Estimated date of discharge: TBD  Discharge is dependent on: clinical stability  At this point Ms. Cross is expected to be discharge

## 2018-07-31 NOTE — OPERATIVE REPORT
[facility]    PATIENT'S NAME: JOCELYN, [de-identified] S   ATTENDING PHYSICIAN: Estrellita Mack M.D. OPERATING PHYSICIAN: Estrellita Mcak M.D.    PATIENT ACCOUNT#:   [de-identified]    LOCATION:  [address] [place] [address]  MEDICAL RECORD #:   [id number]       DATE OF BIRTH:  [birth year] At no time during the operation were any unusual masses found. Therefore, no frozen sections were indicated or sent. All specimens removed were sent to Pathology for evaluation.   Approximately 575 g were removed from the larger right breast and just over straight through the breast tissue until it met with the dissection that had extended from the old inframammary fold. This provided for a wedge-shape excision and, in effect, removed the skin and breast tissue that was in excess and ptotic.  Hemostasis was triangle and secured with interrupted Vicryl sutures. There was no tension on this flap at its inset into its new higher position.  The lateral flap was then able to be brought into coaptation with the medial flap, essentially coapting the lines of the de-e 13:48:20  [facility] [id number]  JF/

## 2018-07-31 NOTE — PAYOR COMM NOTE
--------------  ADMISSION REVIEW     Payor: Cande [employer] [facility]  Subscriber #:  [id number]  Authorization Number: N/A    Admit date: N/A  Admit time: N/A       Admitting Physician: Karissa Chawla MD  Attending Physician:  Karissa Chawla MD  Primary mouth daily.    Disp:  Rfl:  7/12/2018   VITAMIN E daily Disp:  Rfl:  7/12/2018   VITAMIN D 1000 UNIT OR CAPS 2000 IU daily Disp:  Rfl:  7/12/2018   MAGNESIUM HYDROXIDE 400 MG OR CHEW daily Disp:  Rfl:  Past Month at Unknown time   MULTIVITAMINS OR TABS 1 T Valvular disease    • Visual impairment     glasses     Past Surgical History:  2/25/10: CALCIUM SCORE, CARDIO ([facility])      Comment: Calcium score was 4  5/06; 10/11: COLONOSCOPY      Comment: internal hemorrhoids  No date: COLPOSCOPY, CERVIX W/UPPER ADJACEN hypothryoidism   • Arthritis Sister      Bilateral knee replacements   • Breast Cancer Paternal Cousin Female 61     approx age   • Breast Cancer Paternal Cousin Female 54   • Breast Cancer Paternal Aunt      dx age 63's        Smoking status: Never Smoker 2 Units Subcutaneous (Left Upper Arm) Jacque Michelle, RN      Pantoprazole Sodium (PROTONIX) EC tab 40 mg     Date Action Dose Route User    7/31/2018 3831 Given 40 mg Oral Jacque Michelle RN        Pre-Operative Diagnosis: BILATERAL BREAST HYPERTROPHY

## 2018-08-06 RX ORDER — ATORVASTATIN CALCIUM 20 MG/1
TABLET, FILM COATED ORAL
Qty: 30 TABLET | Refills: 5 | Status: SHIPPED | OUTPATIENT
Start: 2018-08-06 | End: 2019-02-03

## 2018-08-22 ENCOUNTER — MED REC SCAN ONLY (OUTPATIENT)
Dept: FAMILY MEDICINE CLINIC | Facility: CLINIC | Age: 58
End: 2018-08-22

## 2018-08-29 ENCOUNTER — TELEPHONE (OUTPATIENT)
Dept: FAMILY MEDICINE CLINIC | Facility: CLINIC | Age: 58
End: 2018-08-29

## 2018-08-29 NOTE — TELEPHONE ENCOUNTER
Annual health screening form completed and faxed back to [facility] at [phone]. Confirmation received. Form sent to scan.

## 2018-09-12 ENCOUNTER — MED REC SCAN ONLY (OUTPATIENT)
Dept: FAMILY MEDICINE CLINIC | Facility: CLINIC | Age: 58
End: 2018-09-12

## 2019-02-03 DIAGNOSIS — E11.9 DIABETES MELLITUS WITHOUT COMPLICATION (HCC): ICD-10-CM

## 2019-02-04 RX ORDER — ATORVASTATIN CALCIUM 20 MG/1
TABLET, FILM COATED ORAL
Qty: 90 TABLET | Refills: 4 | Status: SHIPPED | OUTPATIENT
Start: 2019-02-04 | End: 2019-02-06

## 2019-02-04 NOTE — TELEPHONE ENCOUNTER
Medication(s) to Refill:   Requested Prescriptions     Pending Prescriptions Disp Refills   • ATORVASTATIN 20 MG Oral Tab [Pharmacy Med Name: Atorvastatin Calcium Oral Tablet 20 MG] 90 tablet 4     Sig: TAKE 1 TABLET BY MOUTH IN THE EVENING   • METFORMIN H

## 2019-02-06 ENCOUNTER — OFFICE VISIT (OUTPATIENT)
Dept: FAMILY MEDICINE CLINIC | Facility: CLINIC | Age: 59
End: 2019-02-06
Payer: COMMERCIAL

## 2019-02-06 VITALS
DIASTOLIC BLOOD PRESSURE: 80 MMHG | OXYGEN SATURATION: 99 % | WEIGHT: 156 LBS | RESPIRATION RATE: 18 BRPM | HEART RATE: 80 BPM | HEIGHT: 61.5 IN | BODY MASS INDEX: 29.08 KG/M2 | SYSTOLIC BLOOD PRESSURE: 120 MMHG | TEMPERATURE: 99 F

## 2019-02-06 DIAGNOSIS — Z13.29 SCREENING FOR ENDOCRINE, NUTRITIONAL, METABOLIC AND IMMUNITY DISORDER: ICD-10-CM

## 2019-02-06 DIAGNOSIS — K21.9 GASTROESOPHAGEAL REFLUX DISEASE WITHOUT ESOPHAGITIS: ICD-10-CM

## 2019-02-06 DIAGNOSIS — Z13.21 SCREENING FOR ENDOCRINE, NUTRITIONAL, METABOLIC AND IMMUNITY DISORDER: ICD-10-CM

## 2019-02-06 DIAGNOSIS — Z13.0 SCREENING FOR ENDOCRINE, NUTRITIONAL, METABOLIC AND IMMUNITY DISORDER: ICD-10-CM

## 2019-02-06 DIAGNOSIS — F32.5 MAJOR DEPRESSIVE DISORDER WITH SINGLE EPISODE, IN FULL REMISSION (HCC): ICD-10-CM

## 2019-02-06 DIAGNOSIS — E11.9 DIABETES MELLITUS WITHOUT COMPLICATION (HCC): ICD-10-CM

## 2019-02-06 DIAGNOSIS — M54.2 CERVICALGIA: Primary | ICD-10-CM

## 2019-02-06 DIAGNOSIS — Z13.228 SCREENING FOR ENDOCRINE, NUTRITIONAL, METABOLIC AND IMMUNITY DISORDER: ICD-10-CM

## 2019-02-06 PROCEDURE — 99214 OFFICE O/P EST MOD 30 MIN: CPT | Performed by: FAMILY MEDICINE

## 2019-02-06 RX ORDER — METHYLPREDNISOLONE 4 MG/1
TABLET ORAL
Qty: 1 KIT | Refills: 0 | Status: SHIPPED | OUTPATIENT
Start: 2019-02-06 | End: 2019-07-08 | Stop reason: ALTCHOICE

## 2019-02-06 RX ORDER — OMEPRAZOLE 40 MG/1
40 CAPSULE, DELAYED RELEASE ORAL
Qty: 90 CAPSULE | Refills: 4 | Status: SHIPPED | OUTPATIENT
Start: 2019-02-06 | End: 2020-05-01

## 2019-02-06 RX ORDER — ESCITALOPRAM OXALATE 5 MG/1
5 TABLET ORAL DAILY
Qty: 90 TABLET | Refills: 4 | Status: SHIPPED | OUTPATIENT
Start: 2019-02-06 | End: 2019-12-08 | Stop reason: ALTCHOICE

## 2019-02-06 RX ORDER — ATORVASTATIN CALCIUM 20 MG/1
TABLET, FILM COATED ORAL
Qty: 90 TABLET | Refills: 4 | Status: SHIPPED | OUTPATIENT
Start: 2019-02-06 | End: 2019-08-23

## 2019-02-06 RX ORDER — ATORVASTATIN CALCIUM 20 MG/1
TABLET, FILM COATED ORAL
Qty: 90 TABLET | Refills: 4 | Status: CANCELLED | OUTPATIENT
Start: 2019-02-06

## 2019-02-06 NOTE — PROGRESS NOTES
Patient presents with:  Depression: Discuss medication        HPI:  Neck has been hurting for one month. Dull  in character,  Radiation to the R hand. .  No new bowel or bladder incontinence. No weakness. Mild numbness or tingling.   8/10. Worsening.     D Disp:  Rfl:    MAGNESIUM HYDROXIDE 400 MG OR CHEW daily Disp:  Rfl:    MULTIVITAMINS OR TABS 1 TABLET DAILY Disp:  Rfl:    CALCIUM 1200 8469-2308 MG-UNIT OR CHEW 1 TABLET TID Disp:  Rfl:    FISH OIL 1000 MG OR CAPS 1 TABLETS TID Disp:  Rfl:    ASPIRIN 80 M repair of prolapsed vagina   • OTHER SURGICAL HISTORY  6/05    bladder suspension   • OTHER SURGICAL HISTORY  8/09    removal of cyst - left hand   • OTHER SURGICAL HISTORY  1980    removal of fibroidenoma - lft axilla   • OTHER SURGICAL HISTORY  2005    t METABOLIC PANEL (14); Future  -     LIPID PANEL; Future  -     HEMOGLOBIN A1C; Future  -     MICROALBUMIN, RANDOM URINE; Future    Screening for endocrine, nutritional, metabolic and immunity disorder  -     CBC WITH DIFFERENTIAL WITH PLATELET;  Future  -

## 2019-02-07 ENCOUNTER — LAB ENCOUNTER (OUTPATIENT)
Dept: LAB | Age: 59
End: 2019-02-07
Attending: FAMILY MEDICINE
Payer: COMMERCIAL

## 2019-02-07 DIAGNOSIS — Z13.21 SCREENING FOR ENDOCRINE, NUTRITIONAL, METABOLIC AND IMMUNITY DISORDER: ICD-10-CM

## 2019-02-07 DIAGNOSIS — E11.9 DIABETES MELLITUS WITHOUT COMPLICATION (HCC): ICD-10-CM

## 2019-02-07 DIAGNOSIS — Z13.228 SCREENING FOR ENDOCRINE, NUTRITIONAL, METABOLIC AND IMMUNITY DISORDER: ICD-10-CM

## 2019-02-07 DIAGNOSIS — Z13.0 SCREENING FOR ENDOCRINE, NUTRITIONAL, METABOLIC AND IMMUNITY DISORDER: ICD-10-CM

## 2019-02-07 DIAGNOSIS — Z13.29 SCREENING FOR ENDOCRINE, NUTRITIONAL, METABOLIC AND IMMUNITY DISORDER: ICD-10-CM

## 2019-02-07 LAB
ALBUMIN SERPL-MCNC: 3.6 G/DL (ref 3.1–4.5)
ALBUMIN/GLOB SERPL: 1.1 {RATIO} (ref 1–2)
ALP LIVER SERPL-CCNC: 83 U/L (ref 46–118)
ALT SERPL-CCNC: 23 U/L (ref 14–54)
ANION GAP SERPL CALC-SCNC: 7 MMOL/L (ref 0–18)
AST SERPL-CCNC: 18 U/L (ref 15–41)
BASOPHILS # BLD AUTO: 0.03 X10(3) UL (ref 0–0.2)
BASOPHILS NFR BLD AUTO: 0.6 %
BILIRUB SERPL-MCNC: 0.4 MG/DL (ref 0.1–2)
BUN BLD-MCNC: 15 MG/DL (ref 8–20)
BUN/CREAT SERPL: 24.2 (ref 10–20)
CALCIUM BLD-MCNC: 8.8 MG/DL (ref 8.3–10.3)
CHLORIDE SERPL-SCNC: 107 MMOL/L (ref 101–111)
CHOLEST SMN-MCNC: 155 MG/DL (ref ?–200)
CO2 SERPL-SCNC: 30 MMOL/L (ref 22–32)
CREAT BLD-MCNC: 0.62 MG/DL (ref 0.55–1.02)
CREAT UR-SCNC: 62.8 MG/DL
DEPRECATED RDW RBC AUTO: 46.1 FL (ref 35.1–46.3)
EOSINOPHIL # BLD AUTO: 0.19 X10(3) UL (ref 0–0.7)
EOSINOPHIL NFR BLD AUTO: 3.9 %
ERYTHROCYTE [DISTWIDTH] IN BLOOD BY AUTOMATED COUNT: 12.8 % (ref 11–15)
EST. AVERAGE GLUCOSE BLD GHB EST-MCNC: 140 MG/DL (ref 68–126)
GLOBULIN PLAS-MCNC: 3.3 G/DL (ref 2.8–4.4)
GLUCOSE BLD-MCNC: 121 MG/DL (ref 70–99)
HBA1C MFR BLD HPLC: 6.5 % (ref ?–5.7)
HCT VFR BLD AUTO: 41 % (ref 35–48)
HDLC SERPL-MCNC: 43 MG/DL (ref 40–59)
HGB BLD-MCNC: 13.2 G/DL (ref 12–16)
IMM GRANULOCYTES # BLD AUTO: 0.01 X10(3) UL (ref 0–1)
IMM GRANULOCYTES NFR BLD: 0.2 %
LDLC SERPL CALC-MCNC: 100 MG/DL (ref ?–100)
LYMPHOCYTES # BLD AUTO: 2.14 X10(3) UL (ref 1–4)
LYMPHOCYTES NFR BLD AUTO: 43.7 %
M PROTEIN MFR SERPL ELPH: 6.9 G/DL (ref 6.4–8.2)
MCH RBC QN AUTO: 31.6 PG (ref 26–34)
MCHC RBC AUTO-ENTMCNC: 32.2 G/DL (ref 31–37)
MCV RBC AUTO: 98.1 FL (ref 80–100)
MICROALBUMIN UR-MCNC: <0.5 MG/DL
MONOCYTES # BLD AUTO: 0.32 X10(3) UL (ref 0.1–1)
MONOCYTES NFR BLD AUTO: 6.5 %
NEUTROPHILS # BLD AUTO: 2.21 X10 (3) UL (ref 1.5–7.7)
NEUTROPHILS # BLD AUTO: 2.21 X10(3) UL (ref 1.5–7.7)
NEUTROPHILS NFR BLD AUTO: 45.1 %
NONHDLC SERPL-MCNC: 112 MG/DL (ref ?–130)
OSMOLALITY SERPL CALC.SUM OF ELEC: 300 MOSM/KG (ref 275–295)
PLATELET # BLD AUTO: 301 10(3)UL (ref 150–450)
POTASSIUM SERPL-SCNC: 4 MMOL/L (ref 3.6–5.1)
RBC # BLD AUTO: 4.18 X10(6)UL (ref 3.8–5.3)
SODIUM SERPL-SCNC: 144 MMOL/L (ref 136–144)
TRIGL SERPL-MCNC: 62 MG/DL (ref 30–149)
TSI SER-ACNC: 0.61 MIU/ML (ref 0.35–5.5)
VLDLC SERPL CALC-MCNC: 12 MG/DL (ref 0–30)
WBC # BLD AUTO: 4.9 X10(3) UL (ref 4–11)

## 2019-02-07 PROCEDURE — 36415 COLL VENOUS BLD VENIPUNCTURE: CPT | Performed by: FAMILY MEDICINE

## 2019-02-07 PROCEDURE — 83036 HEMOGLOBIN GLYCOSYLATED A1C: CPT | Performed by: FAMILY MEDICINE

## 2019-02-07 PROCEDURE — 80050 GENERAL HEALTH PANEL: CPT | Performed by: FAMILY MEDICINE

## 2019-02-07 PROCEDURE — 82043 UR ALBUMIN QUANTITATIVE: CPT | Performed by: FAMILY MEDICINE

## 2019-02-07 PROCEDURE — 80061 LIPID PANEL: CPT | Performed by: FAMILY MEDICINE

## 2019-02-07 PROCEDURE — 82570 ASSAY OF URINE CREATININE: CPT | Performed by: FAMILY MEDICINE

## 2019-02-08 ENCOUNTER — TELEPHONE (OUTPATIENT)
Dept: FAMILY MEDICINE CLINIC | Facility: CLINIC | Age: 59
End: 2019-02-08

## 2019-04-01 ENCOUNTER — TELEPHONE (OUTPATIENT)
Dept: FAMILY MEDICINE CLINIC | Facility: CLINIC | Age: 59
End: 2019-04-01

## 2019-04-01 RX ORDER — MOMETASONE FUROATE 1 MG/G
1 CREAM TOPICAL 2 TIMES DAILY PRN
Qty: 60 G | Refills: 0 | Status: SHIPPED | OUTPATIENT
Start: 2019-04-01 | End: 2019-07-08 | Stop reason: ALTCHOICE

## 2019-04-01 RX ORDER — LEVOFLOXACIN 250 MG/1
250 TABLET ORAL DAILY
Qty: 5 TABLET | Refills: 0 | Status: SHIPPED | OUTPATIENT
Start: 2019-04-01 | End: 2019-07-08 | Stop reason: ALTCHOICE

## 2019-07-08 ENCOUNTER — OFFICE VISIT (OUTPATIENT)
Dept: FAMILY MEDICINE CLINIC | Facility: CLINIC | Age: 59
End: 2019-07-08
Payer: COMMERCIAL

## 2019-07-08 VITALS
OXYGEN SATURATION: 100 % | DIASTOLIC BLOOD PRESSURE: 78 MMHG | HEART RATE: 68 BPM | WEIGHT: 158 LBS | TEMPERATURE: 99 F | HEIGHT: 61.5 IN | SYSTOLIC BLOOD PRESSURE: 122 MMHG | BODY MASS INDEX: 29.45 KG/M2 | RESPIRATION RATE: 18 BRPM

## 2019-07-08 DIAGNOSIS — Z13.21 SCREENING FOR ENDOCRINE, NUTRITIONAL, METABOLIC AND IMMUNITY DISORDER: ICD-10-CM

## 2019-07-08 DIAGNOSIS — Z12.31 VISIT FOR SCREENING MAMMOGRAM: ICD-10-CM

## 2019-07-08 DIAGNOSIS — Z13.228 SCREENING FOR ENDOCRINE, NUTRITIONAL, METABOLIC AND IMMUNITY DISORDER: ICD-10-CM

## 2019-07-08 DIAGNOSIS — E11.9 DIABETES MELLITUS WITHOUT COMPLICATION (HCC): ICD-10-CM

## 2019-07-08 DIAGNOSIS — Z00.00 ROUTINE GENERAL MEDICAL EXAMINATION AT A HEALTH CARE FACILITY: Primary | ICD-10-CM

## 2019-07-08 DIAGNOSIS — Z13.29 SCREENING FOR ENDOCRINE, NUTRITIONAL, METABOLIC AND IMMUNITY DISORDER: ICD-10-CM

## 2019-07-08 DIAGNOSIS — Z91.89 AT RISK FOR OSTEOPOROSIS: ICD-10-CM

## 2019-07-08 DIAGNOSIS — Z13.820 SCREENING FOR OSTEOPOROSIS: ICD-10-CM

## 2019-07-08 DIAGNOSIS — Z13.0 SCREENING FOR ENDOCRINE, NUTRITIONAL, METABOLIC AND IMMUNITY DISORDER: ICD-10-CM

## 2019-07-08 PROBLEM — M17.12 ARTHRITIS OF LEFT KNEE: Status: RESOLVED | Noted: 2017-03-15 | Resolved: 2019-07-08

## 2019-07-08 PROBLEM — N62 BREAST HYPERTROPHY: Status: RESOLVED | Noted: 2018-07-31 | Resolved: 2019-07-08

## 2019-07-08 PROBLEM — S83.231D COMPLEX TEAR OF MEDIAL MENISCUS OF RIGHT KNEE AS CURRENT INJURY, SUBSEQUENT ENCOUNTER: Status: RESOLVED | Noted: 2017-09-06 | Resolved: 2019-07-08

## 2019-07-08 PROBLEM — M71.22 POPLITEAL CYST, LEFT: Status: RESOLVED | Noted: 2017-10-18 | Resolved: 2019-07-08

## 2019-07-08 PROBLEM — M94.261 CHONDROMALACIA OF RIGHT KNEE: Status: RESOLVED | Noted: 2017-09-06 | Resolved: 2019-07-08

## 2019-07-08 PROBLEM — M23.92 INTERNAL DERANGEMENT OF KNEE, LEFT: Status: RESOLVED | Noted: 2018-05-09 | Resolved: 2019-07-08

## 2019-07-08 PROCEDURE — 99396 PREV VISIT EST AGE 40-64: CPT | Performed by: FAMILY MEDICINE

## 2019-07-08 NOTE — PROGRESS NOTES
Teagan Friend is a 60year old female who presents for a complete physical exam, no gyn. HPI:     Patient presents with:  Physical: Knee pain      Patient feels well, dental visit up to date, no hearing problem. Vaccinations up to date.   DM: under good ASPIRIN 81 MG OR TABS 1 TABLET DAILY Disp:  Rfl:    SLOW FE OR 1 DAILY occasional Disp:  Rfl:       Past Medical History:   Diagnosis Date   • ACTINIC KERATOSIS/dysplastic nevi 10/8/2008    Dr. Briana Brito follows.    • Depression    • Esophageal reflux    • G axilla   • OTHER SURGICAL HISTORY  2005    tummy tuck   • STRESS ECHO TEST, CARDIO ([facility])  4/6/12    no ischemia   • UPPER GI ENDOSCOPY - REFERRAL  10/6/11    no malignancy, diffuse intramucosal eosinophils      Family History   Problem Relation Age of Onse excessive thirst or urination; denies unexpected wt gain or wt loss  ALLERGY/IMM.: denies food or seasonal allergies  PSYCH: no symptoms of depression or anxiety      EXAM:   /78   Pulse 68   Temp 98.7 °F (37.1 °C) (Oral)   Resp 18   Ht 61.5\"   Wt 1 colonoscopy. Anxiety: doing well. Pt wants to stop Lexapro.   Orders Placed This Encounter      HGB A1C          Standing Status: Future          Standing Expiration Date: 7/7/2020      LIPID PANEL          Standing Status: Future          Standing Danya Flatness

## 2019-07-08 NOTE — PATIENT INSTRUCTIONS
Eating Out When You Have Diabetes    Eating right is an important part of keeping your blood sugar in your target range. You just need to make healthy choices.   Tips for restaurant meals  When you eat away from home try these tips:  · Try to schedule you · Steamed rice or boiled noodles. One serving is equal to 1/3 cup. Date Last Reviewed: 6/1/2016  © 0650-7972 [employer]. [address] [facility], [address] [place] [address]. All rights reserved.  This information is not intended as a substitute for pr · Limit alcohol. It raises blood sugar levels. Drink water or calorie-free diet drinks that use safe sweeteners. · Eat less fat to help lower your risk of heart disease. Use nonfat or low-fat dairy products and lean meats. Avoid fried foods.  Use cooking o

## 2019-08-17 ENCOUNTER — APPOINTMENT (OUTPATIENT)
Dept: LAB | Age: 59
End: 2019-08-17
Attending: FAMILY MEDICINE
Payer: COMMERCIAL

## 2019-08-17 DIAGNOSIS — Z13.29 SCREENING FOR ENDOCRINE, NUTRITIONAL, METABOLIC AND IMMUNITY DISORDER: ICD-10-CM

## 2019-08-17 DIAGNOSIS — Z13.0 SCREENING FOR ENDOCRINE, NUTRITIONAL, METABOLIC AND IMMUNITY DISORDER: ICD-10-CM

## 2019-08-17 DIAGNOSIS — Z13.228 SCREENING FOR ENDOCRINE, NUTRITIONAL, METABOLIC AND IMMUNITY DISORDER: ICD-10-CM

## 2019-08-17 DIAGNOSIS — E11.9 DIABETES MELLITUS WITHOUT COMPLICATION (HCC): ICD-10-CM

## 2019-08-17 DIAGNOSIS — Z13.21 SCREENING FOR ENDOCRINE, NUTRITIONAL, METABOLIC AND IMMUNITY DISORDER: ICD-10-CM

## 2019-08-17 LAB
ALBUMIN SERPL-MCNC: 3.6 G/DL (ref 3.4–5)
ALBUMIN/GLOB SERPL: 1.1 {RATIO} (ref 1–2)
ALP LIVER SERPL-CCNC: 78 U/L (ref 46–118)
ALT SERPL-CCNC: 27 U/L (ref 13–56)
ANION GAP SERPL CALC-SCNC: 6 MMOL/L (ref 0–18)
AST SERPL-CCNC: 15 U/L (ref 15–37)
BILIRUB SERPL-MCNC: 0.4 MG/DL (ref 0.1–2)
BUN BLD-MCNC: 23 MG/DL (ref 7–18)
BUN/CREAT SERPL: 31.1 (ref 10–20)
CALCIUM BLD-MCNC: 8.5 MG/DL (ref 8.5–10.1)
CHLORIDE SERPL-SCNC: 107 MMOL/L (ref 98–112)
CHOLEST SMN-MCNC: 235 MG/DL (ref ?–200)
CO2 SERPL-SCNC: 29 MMOL/L (ref 21–32)
CREAT BLD-MCNC: 0.74 MG/DL (ref 0.55–1.02)
EST. AVERAGE GLUCOSE BLD GHB EST-MCNC: 143 MG/DL (ref 68–126)
GLOBULIN PLAS-MCNC: 3.4 G/DL (ref 2.8–4.4)
GLUCOSE BLD-MCNC: 114 MG/DL (ref 70–99)
HBA1C MFR BLD HPLC: 6.6 % (ref ?–5.7)
HDLC SERPL-MCNC: 49 MG/DL (ref 40–59)
LDLC SERPL CALC-MCNC: 156 MG/DL (ref ?–100)
M PROTEIN MFR SERPL ELPH: 7 G/DL (ref 6.4–8.2)
NONHDLC SERPL-MCNC: 186 MG/DL (ref ?–130)
OSMOLALITY SERPL CALC.SUM OF ELEC: 299 MOSM/KG (ref 275–295)
POTASSIUM SERPL-SCNC: 4.1 MMOL/L (ref 3.5–5.1)
SODIUM SERPL-SCNC: 142 MMOL/L (ref 136–145)
TRIGL SERPL-MCNC: 150 MG/DL (ref 30–149)
VLDLC SERPL CALC-MCNC: 30 MG/DL (ref 0–30)

## 2019-08-17 PROCEDURE — 83036 HEMOGLOBIN GLYCOSYLATED A1C: CPT | Performed by: FAMILY MEDICINE

## 2019-08-17 PROCEDURE — 80061 LIPID PANEL: CPT | Performed by: FAMILY MEDICINE

## 2019-08-17 PROCEDURE — 36415 COLL VENOUS BLD VENIPUNCTURE: CPT | Performed by: FAMILY MEDICINE

## 2019-08-17 PROCEDURE — 80053 COMPREHEN METABOLIC PANEL: CPT | Performed by: FAMILY MEDICINE

## 2019-08-21 ENCOUNTER — TELEPHONE (OUTPATIENT)
Dept: FAMILY MEDICINE CLINIC | Facility: CLINIC | Age: 59
End: 2019-08-21

## 2019-08-21 ENCOUNTER — HOSPITAL ENCOUNTER (OUTPATIENT)
Dept: BONE DENSITY | Age: 59
Discharge: HOME | End: 2019-08-21
Attending: FAMILY MEDICINE
Payer: COMMERCIAL

## 2019-08-21 DIAGNOSIS — Z91.89 AT RISK FOR OSTEOPOROSIS: ICD-10-CM

## 2019-08-21 DIAGNOSIS — Z13.820 SCREENING FOR OSTEOPOROSIS: ICD-10-CM

## 2019-08-21 PROCEDURE — 77080 DXA BONE DENSITY AXIAL: CPT | Performed by: FAMILY MEDICINE

## 2019-08-21 NOTE — TELEPHONE ENCOUNTER
----- Message from Zia Diaz MD sent at 8/19/2019  9:03 AM CDT -----  Is pt on statin? Glucose is very well controlled. If nto on Lipitor ok to restart it.

## 2019-08-22 ENCOUNTER — TELEPHONE (OUTPATIENT)
Dept: FAMILY MEDICINE CLINIC | Facility: CLINIC | Age: 59
End: 2019-08-22

## 2019-08-22 NOTE — TELEPHONE ENCOUNTER
----- Message from Beth Spencer MD sent at 8/22/2019  8:05 AM CDT -----  Osteopenia. Caltrate 600+D once a day recommended. Next Dexa in two years.

## 2019-08-22 NOTE — TELEPHONE ENCOUNTER
----- Message from Mell Krause MD sent at 8/19/2019  9:03 AM CDT -----  Is pt on statin? Glucose is very well controlled. If nto on Lipitor ok to restart     Message sent to pt via [employer] on 8/21 also.

## 2019-08-23 RX ORDER — ATORVASTATIN CALCIUM 20 MG/1
TABLET, FILM COATED ORAL
Qty: 90 TABLET | Refills: 4 | COMMUNITY
Start: 2019-08-23 | End: 2020-05-01

## 2019-08-23 NOTE — TELEPHONE ENCOUNTER
Patient aware of DEXA scan results and all lab results. She will continue taking Caltrate +d.  patient has not been taking the atorvastatin because she was feeling achy. Since being off she has felt a little better. She is agreeable to restarting.  OK

## 2019-09-04 ENCOUNTER — HOSPITAL ENCOUNTER (OUTPATIENT)
Dept: MAMMOGRAPHY | Age: 59
Discharge: HOME | End: 2019-09-04
Attending: FAMILY MEDICINE
Payer: COMMERCIAL

## 2019-09-04 DIAGNOSIS — Z12.31 VISIT FOR SCREENING MAMMOGRAM: ICD-10-CM

## 2019-09-04 PROCEDURE — 77067 SCR MAMMO BI INCL CAD: CPT | Performed by: FAMILY MEDICINE

## 2019-09-04 PROCEDURE — 77063 BREAST TOMOSYNTHESIS BI: CPT | Performed by: FAMILY MEDICINE

## 2019-09-05 ENCOUNTER — HOSPITAL ENCOUNTER (OUTPATIENT)
Dept: MAMMOGRAPHY | Age: 59
Discharge: HOME | End: 2019-09-05
Attending: FAMILY MEDICINE
Payer: COMMERCIAL

## 2019-09-05 ENCOUNTER — TELEPHONE (OUTPATIENT)
Dept: FAMILY MEDICINE CLINIC | Facility: CLINIC | Age: 59
End: 2019-09-05

## 2019-09-05 ENCOUNTER — HOSPITAL ENCOUNTER (OUTPATIENT)
Dept: ULTRASOUND IMAGING | Age: 59
Discharge: HOME | End: 2019-09-05
Attending: FAMILY MEDICINE
Payer: COMMERCIAL

## 2019-09-05 DIAGNOSIS — R92.2 INCONCLUSIVE MAMMOGRAM: ICD-10-CM

## 2019-09-05 PROCEDURE — 76642 ULTRASOUND BREAST LIMITED: CPT | Performed by: FAMILY MEDICINE

## 2019-09-05 PROCEDURE — 77061 BREAST TOMOSYNTHESIS UNI: CPT | Performed by: FAMILY MEDICINE

## 2019-09-05 PROCEDURE — 77065 DX MAMMO INCL CAD UNI: CPT | Performed by: FAMILY MEDICINE

## 2019-09-05 NOTE — TELEPHONE ENCOUNTER
----- Message from Selam Garay MD sent at 9/5/2019  8:06 AM CDT -----  Needs additional views of the mammogram.

## 2019-11-08 ENCOUNTER — MED REC SCAN ONLY (OUTPATIENT)
Dept: FAMILY MEDICINE CLINIC | Facility: CLINIC | Age: 59
End: 2019-11-08

## 2019-11-11 ENCOUNTER — MED REC SCAN ONLY (OUTPATIENT)
Dept: FAMILY MEDICINE CLINIC | Facility: CLINIC | Age: 59
End: 2019-11-11

## 2019-11-29 ENCOUNTER — HOSPITAL ENCOUNTER (OUTPATIENT)
Dept: ULTRASOUND IMAGING | Age: 59
Discharge: HOME | End: 2019-11-29
Attending: OTOLARYNGOLOGY
Payer: COMMERCIAL

## 2019-11-29 DIAGNOSIS — E04.1 THYROID NODULE: ICD-10-CM

## 2019-11-29 PROCEDURE — 76536 US EXAM OF HEAD AND NECK: CPT | Performed by: OTOLARYNGOLOGY

## 2019-12-08 ENCOUNTER — OFFICE VISIT (OUTPATIENT)
Dept: FAMILY MEDICINE CLINIC | Facility: CLINIC | Age: 59
End: 2019-12-08
Payer: COMMERCIAL

## 2019-12-08 VITALS
DIASTOLIC BLOOD PRESSURE: 66 MMHG | WEIGHT: 160 LBS | RESPIRATION RATE: 20 BRPM | HEIGHT: 61.5 IN | BODY MASS INDEX: 29.82 KG/M2 | OXYGEN SATURATION: 96 % | HEART RATE: 84 BPM | SYSTOLIC BLOOD PRESSURE: 122 MMHG | TEMPERATURE: 99 F

## 2019-12-08 DIAGNOSIS — L50.0 URTICARIA DUE TO DRUG ALLERGY: Primary | ICD-10-CM

## 2019-12-08 DIAGNOSIS — T50.905A MEDICATION REACTION, INITIAL ENCOUNTER: ICD-10-CM

## 2019-12-08 DIAGNOSIS — T50.905A URTICARIA DUE TO DRUG ALLERGY: Primary | ICD-10-CM

## 2019-12-08 PROCEDURE — 99213 OFFICE O/P EST LOW 20 MIN: CPT | Performed by: NURSE PRACTITIONER

## 2019-12-08 RX ORDER — PREDNISONE 20 MG/1
40 TABLET ORAL DAILY
Qty: 10 TABLET | Refills: 0 | Status: SHIPPED | OUTPATIENT
Start: 2019-12-08 | End: 2019-12-13

## 2019-12-08 RX ORDER — PHENAZOPYRIDINE HYDROCHLORIDE 200 MG/1
200 TABLET, FILM COATED ORAL
COMMUNITY
Start: 2019-12-01 | End: 2020-08-05

## 2019-12-08 RX ORDER — SULFAMETHOXAZOLE AND TRIMETHOPRIM 800; 160 MG/1; MG/1
TABLET ORAL
Refills: 0 | COMMUNITY
Start: 2019-12-01 | End: 2020-08-05

## 2019-12-08 NOTE — PATIENT INSTRUCTIONS
Reaction to Medicine (Other Type)  You are having a reaction to a medicine you have taken. This may not be the same as an allergic reaction.  It is an undesired, unfavorable reaction, or a side effect of a medicine. This can cause a variety of symptoms, i · Fever of 100.4°F (38°C) or higher, or as advised by your healthcare provider  Call 911  Call 911 if any of these occur:  · Trouble breathing or swallowing, or wheezing  · Hoarse voice or trouble speaking  · Confusion  · Extreme drowsiness or trouble awak

## 2019-12-08 NOTE — PROGRESS NOTES
CHIEF COMPLAINT:   Patient presents with:  Rash: X 1-2 days          HPI:    Criss Cheney is a 60year old female who presents for evaluation of a rash. Per patient rash started last night. Rash has been worsening since onset.   Patient has not had simila • MULTIVITAMINS OR TABS 1 TABLET DAILY     • CALCIUM 1200 8782-4997 MG-UNIT OR CHEW 1 TABLET TID     • FISH OIL 1000 MG OR CAPS 1 TABLETS TID     • ASPIRIN 81 MG OR TABS 1 TABLET DAILY     • SLOW FE OR 1 DAILY occasional     • Sulfamethoxazole-TMP -1 • OTHER      lipoma removed age 20   • OTHER  8/05    rectocele, repair of prolapsed vagina   • OTHER SURGICAL HISTORY  6/05    bladder suspension   • OTHER SURGICAL HISTORY  8/09    removal of cyst - left hand   • OTHER SURGICAL HISTORY  1980    removal o LYMPH: Denies enlargement of the lymph nodes. NEURO: Denies abnormal sensation, tingling of the skin, or numbness.       EXAM:   /66   Pulse 84   Temp 98.7 °F (37.1 °C) (Oral)   Resp 20   Ht 61.5\"   Wt 160 lb (72.6 kg)   SpO2 96%   Breastfeeding No You are having a reaction to a medicine you have taken. This may not be the same as an allergic reaction.  It is an undesired, unfavorable reaction, or a side effect of a medicine. This can cause a variety of symptoms, including:  · Dizziness or headache  · Call 911 if any of these occur:  · Trouble breathing or swallowing, or wheezing  · Hoarse voice or trouble speaking  · Confusion  · Extreme drowsiness or trouble awakening  · Fainting or loss of consciousness  · Rapid heart rate or slow heart rate  · Very

## 2020-01-31 ENCOUNTER — PATIENT MESSAGE (OUTPATIENT)
Dept: FAMILY MEDICINE CLINIC | Facility: CLINIC | Age: 60
End: 2020-01-31

## 2020-01-31 DIAGNOSIS — E11.9 DIABETES MELLITUS WITHOUT COMPLICATION (HCC): ICD-10-CM

## 2020-01-31 NOTE — TELEPHONE ENCOUNTER
From: Denise Cross  To: Jazmine Choe MD  Sent: 1/31/2020 9:50 AM CST  Subject: Prescription Question    I would like to refill my Metformin 500mg prescription, but now it needs to go thru the [facility] pharmacy instead of [facility].  Also, can it please be a 3

## 2020-02-10 ENCOUNTER — MED REC SCAN ONLY (OUTPATIENT)
Dept: FAMILY MEDICINE CLINIC | Facility: CLINIC | Age: 60
End: 2020-02-10

## 2020-03-06 ENCOUNTER — TELEPHONE (OUTPATIENT)
Dept: FAMILY MEDICINE CLINIC | Facility: CLINIC | Age: 60
End: 2020-03-06

## 2020-03-06 DIAGNOSIS — R92.8 ABNORMAL MAMMOGRAM: Primary | ICD-10-CM

## 2020-04-02 ENCOUNTER — TELEPHONE (OUTPATIENT)
Dept: FAMILY MEDICINE CLINIC | Facility: CLINIC | Age: 60
End: 2020-04-02

## 2020-04-02 ENCOUNTER — HOSPITAL ENCOUNTER (OUTPATIENT)
Dept: MAMMOGRAPHY | Facility: HOSPITAL | Age: 60
Discharge: HOME | End: 2020-04-02
Attending: FAMILY MEDICINE
Payer: COMMERCIAL

## 2020-04-02 DIAGNOSIS — R92.8 ABNORMAL MAMMOGRAM: ICD-10-CM

## 2020-04-02 PROCEDURE — 76641 ULTRASOUND BREAST COMPLETE: CPT | Performed by: FAMILY MEDICINE

## 2020-04-02 PROCEDURE — 77061 BREAST TOMOSYNTHESIS UNI: CPT | Performed by: FAMILY MEDICINE

## 2020-04-02 PROCEDURE — 77065 DX MAMMO INCL CAD UNI: CPT | Performed by: FAMILY MEDICINE

## 2020-04-02 NOTE — TELEPHONE ENCOUNTER
----- Message from Familia Mayorga MD sent at 4/2/2020 12:01 PM CDT -----  6 months bilateral breasts mammogram dgn.

## 2020-04-24 ENCOUNTER — TELEMEDICINE (OUTPATIENT)
Dept: FAMILY MEDICINE CLINIC | Facility: CLINIC | Age: 60
End: 2020-04-24
Payer: COMMERCIAL

## 2020-04-24 DIAGNOSIS — F32.A MILD DEPRESSION: Primary | ICD-10-CM

## 2020-04-24 PROCEDURE — 99214 OFFICE O/P EST MOD 30 MIN: CPT | Performed by: FAMILY MEDICINE

## 2020-04-24 RX ORDER — ESCITALOPRAM OXALATE 5 MG/1
5 TABLET ORAL DAILY
Qty: 30 TABLET | Refills: 11 | Status: SHIPPED | OUTPATIENT
Start: 2020-04-24 | End: 2021-04-21

## 2020-04-24 NOTE — PROGRESS NOTES
Laurence Pat verbally consents to a Virtual/Telephone Check-In service on 04/24/20. Time spent: 30 min    Laurence Pat is a 60year old female. CC: depression    HPI:   Pt here to with a new complain of depressed mood.  Pt feels down, no energy, sen KERATOSIS/dysplastic nevi 10/8/2008    Dr. Dionna Valle follows.    • Depression    • Esophageal reflux    • GLUCOSE INTOLERANCE 10/6/2008    HISTORY OF GESTATIONAL DIABETES   • Hashimoto's thyroiditis 1/26/2010   • High cholesterol    • Internal hemorrhoids with stop medication and call office immediately. Requested Prescriptions     Signed Prescriptions Disp Refills   • escitalopram (LEXAPRO) 5 MG Oral Tab 30 tablet 11     Sig: Take 1 tablet (5 mg total) by mouth daily.      At this time patient defers referral t

## 2020-05-01 RX ORDER — ATORVASTATIN CALCIUM 20 MG/1
TABLET, FILM COATED ORAL
Qty: 90 TABLET | Refills: 0 | Status: SHIPPED | OUTPATIENT
Start: 2020-05-01 | End: 2020-10-08

## 2020-05-01 RX ORDER — OMEPRAZOLE 40 MG/1
40 CAPSULE, DELAYED RELEASE ORAL
Qty: 90 CAPSULE | Refills: 0 | Status: SHIPPED | OUTPATIENT
Start: 2020-05-01 | End: 2020-07-23

## 2020-07-23 RX ORDER — OMEPRAZOLE 40 MG/1
CAPSULE, DELAYED RELEASE ORAL
Qty: 90 CAPSULE | Refills: 0 | Status: SHIPPED | OUTPATIENT
Start: 2020-07-23 | End: 2020-10-14

## 2020-07-23 NOTE — TELEPHONE ENCOUNTER
Medication(s) to Refill:   Requested Prescriptions     Pending Prescriptions Disp Refills   • OMEPRAZOLE 40 MG Oral Capsule Delayed Release [Pharmacy Med Name: OMEPRAZOLE DR 40 MG CAPSULE] 90 capsule 0     Sig: TAKE 1 CAPSULE BY MOUTH EVERY DAY         Shantel Urbano

## 2020-08-05 ENCOUNTER — OFFICE VISIT (OUTPATIENT)
Dept: FAMILY MEDICINE CLINIC | Facility: CLINIC | Age: 60
End: 2020-08-05
Payer: COMMERCIAL

## 2020-08-05 ENCOUNTER — LAB ENCOUNTER (OUTPATIENT)
Dept: LAB | Age: 60
End: 2020-08-05
Attending: NURSE PRACTITIONER
Payer: COMMERCIAL

## 2020-08-05 VITALS
HEART RATE: 80 BPM | DIASTOLIC BLOOD PRESSURE: 80 MMHG | RESPIRATION RATE: 16 BRPM | HEIGHT: 61.5 IN | SYSTOLIC BLOOD PRESSURE: 122 MMHG | OXYGEN SATURATION: 98 % | WEIGHT: 157 LBS | TEMPERATURE: 97 F | BODY MASS INDEX: 29.26 KG/M2

## 2020-08-05 DIAGNOSIS — Z13.21 ENCOUNTER FOR VITAMIN DEFICIENCY SCREENING: ICD-10-CM

## 2020-08-05 DIAGNOSIS — Z00.00 LABORATORY EXAM ORDERED AS PART OF ROUTINE GENERAL MEDICAL EXAMINATION: ICD-10-CM

## 2020-08-05 DIAGNOSIS — E11.9 DIABETES MELLITUS WITHOUT COMPLICATION (HCC): ICD-10-CM

## 2020-08-05 DIAGNOSIS — M65.342 TRIGGER RING FINGER OF LEFT HAND: ICD-10-CM

## 2020-08-05 DIAGNOSIS — D64.9 ANEMIA, UNSPECIFIED TYPE: ICD-10-CM

## 2020-08-05 DIAGNOSIS — Z12.31 VISIT FOR SCREENING MAMMOGRAM: ICD-10-CM

## 2020-08-05 DIAGNOSIS — Z00.00 ROUTINE GENERAL MEDICAL EXAMINATION AT A HEALTH CARE FACILITY: Primary | ICD-10-CM

## 2020-08-05 LAB
ALBUMIN SERPL-MCNC: 3.9 G/DL (ref 3.4–5)
ALBUMIN/GLOB SERPL: 1 {RATIO} (ref 1–2)
ALP LIVER SERPL-CCNC: 104 U/L (ref 46–118)
ALT SERPL-CCNC: 42 U/L (ref 13–56)
ANION GAP SERPL CALC-SCNC: 1 MMOL/L (ref 0–18)
AST SERPL-CCNC: 20 U/L (ref 15–37)
BASOPHILS # BLD AUTO: 0.03 X10(3) UL (ref 0–0.2)
BASOPHILS NFR BLD AUTO: 0.6 %
BILIRUB SERPL-MCNC: 0.5 MG/DL (ref 0.1–2)
BUN BLD-MCNC: 18 MG/DL (ref 7–18)
BUN/CREAT SERPL: 22.8 (ref 10–20)
CALCIUM BLD-MCNC: 9.4 MG/DL (ref 8.5–10.1)
CHLORIDE SERPL-SCNC: 107 MMOL/L (ref 98–112)
CHOLEST SMN-MCNC: 203 MG/DL (ref ?–200)
CO2 SERPL-SCNC: 32 MMOL/L (ref 21–32)
CREAT BLD-MCNC: 0.79 MG/DL (ref 0.55–1.02)
CREAT UR-SCNC: 143 MG/DL
DEPRECATED RDW RBC AUTO: 53.4 FL (ref 35.1–46.3)
EOSINOPHIL # BLD AUTO: 0.13 X10(3) UL (ref 0–0.7)
EOSINOPHIL NFR BLD AUTO: 2.5 %
ERYTHROCYTE [DISTWIDTH] IN BLOOD BY AUTOMATED COUNT: 15.9 % (ref 11–15)
EST. AVERAGE GLUCOSE BLD GHB EST-MCNC: 146 MG/DL (ref 68–126)
GLOBULIN PLAS-MCNC: 3.8 G/DL (ref 2.8–4.4)
GLUCOSE BLD-MCNC: 128 MG/DL (ref 70–99)
HBA1C MFR BLD HPLC: 6.7 % (ref ?–5.7)
HCT VFR BLD AUTO: 39.2 % (ref 35–48)
HDLC SERPL-MCNC: 48 MG/DL (ref 40–59)
HGB BLD-MCNC: 11.9 G/DL (ref 12–16)
IMM GRANULOCYTES # BLD AUTO: 0.01 X10(3) UL (ref 0–1)
IMM GRANULOCYTES NFR BLD: 0.2 %
LDLC SERPL CALC-MCNC: 138 MG/DL (ref ?–100)
LYMPHOCYTES # BLD AUTO: 2.19 X10(3) UL (ref 1–4)
LYMPHOCYTES NFR BLD AUTO: 41.8 %
M PROTEIN MFR SERPL ELPH: 7.7 G/DL (ref 6.4–8.2)
MCH RBC QN AUTO: 27.9 PG (ref 26–34)
MCHC RBC AUTO-ENTMCNC: 30.4 G/DL (ref 31–37)
MCV RBC AUTO: 92 FL (ref 80–100)
MICROALBUMIN UR-MCNC: 0.95 MG/DL
MICROALBUMIN/CREAT 24H UR-RTO: 6.6 UG/MG (ref ?–30)
MONOCYTES # BLD AUTO: 0.35 X10(3) UL (ref 0.1–1)
MONOCYTES NFR BLD AUTO: 6.7 %
NEUTROPHILS # BLD AUTO: 2.53 X10 (3) UL (ref 1.5–7.7)
NEUTROPHILS # BLD AUTO: 2.53 X10(3) UL (ref 1.5–7.7)
NEUTROPHILS NFR BLD AUTO: 48.2 %
NONHDLC SERPL-MCNC: 155 MG/DL (ref ?–130)
OSMOLALITY SERPL CALC.SUM OF ELEC: 294 MOSM/KG (ref 275–295)
PATIENT FASTING Y/N/NP: YES
PATIENT FASTING Y/N/NP: YES
PLATELET # BLD AUTO: 374 10(3)UL (ref 150–450)
POTASSIUM SERPL-SCNC: 4.2 MMOL/L (ref 3.5–5.1)
RBC # BLD AUTO: 4.26 X10(6)UL (ref 3.8–5.3)
SODIUM SERPL-SCNC: 140 MMOL/L (ref 136–145)
TRIGL SERPL-MCNC: 87 MG/DL (ref 30–149)
TSI SER-ACNC: 0.8 MIU/ML (ref 0.36–3.74)
VIT D+METAB SERPL-MCNC: 90 NG/ML (ref 30–100)
VLDLC SERPL CALC-MCNC: 17 MG/DL (ref 0–30)
WBC # BLD AUTO: 5.2 X10(3) UL (ref 4–11)

## 2020-08-05 PROCEDURE — 36415 COLL VENOUS BLD VENIPUNCTURE: CPT | Performed by: NURSE PRACTITIONER

## 2020-08-05 PROCEDURE — 80050 GENERAL HEALTH PANEL: CPT | Performed by: NURSE PRACTITIONER

## 2020-08-05 PROCEDURE — 82570 ASSAY OF URINE CREATININE: CPT | Performed by: NURSE PRACTITIONER

## 2020-08-05 PROCEDURE — 82306 VITAMIN D 25 HYDROXY: CPT | Performed by: NURSE PRACTITIONER

## 2020-08-05 PROCEDURE — 3008F BODY MASS INDEX DOCD: CPT | Performed by: NURSE PRACTITIONER

## 2020-08-05 PROCEDURE — 99396 PREV VISIT EST AGE 40-64: CPT | Performed by: NURSE PRACTITIONER

## 2020-08-05 PROCEDURE — 82728 ASSAY OF FERRITIN: CPT | Performed by: NURSE PRACTITIONER

## 2020-08-05 PROCEDURE — 3074F SYST BP LT 130 MM HG: CPT | Performed by: NURSE PRACTITIONER

## 2020-08-05 PROCEDURE — 83550 IRON BINDING TEST: CPT | Performed by: NURSE PRACTITIONER

## 2020-08-05 PROCEDURE — 83540 ASSAY OF IRON: CPT | Performed by: NURSE PRACTITIONER

## 2020-08-05 PROCEDURE — 3079F DIAST BP 80-89 MM HG: CPT | Performed by: NURSE PRACTITIONER

## 2020-08-05 PROCEDURE — 80061 LIPID PANEL: CPT | Performed by: NURSE PRACTITIONER

## 2020-08-05 PROCEDURE — 82043 UR ALBUMIN QUANTITATIVE: CPT | Performed by: NURSE PRACTITIONER

## 2020-08-05 PROCEDURE — 83036 HEMOGLOBIN GLYCOSYLATED A1C: CPT | Performed by: NURSE PRACTITIONER

## 2020-08-05 RX ORDER — MELOXICAM 15 MG/1
15 TABLET ORAL DAILY
Qty: 30 TABLET | Refills: 0 | Status: SHIPPED | OUTPATIENT
Start: 2020-08-05 | End: 2020-09-04

## 2020-08-05 RX ORDER — BLOOD-GLUCOSE METER
1 EACH MISCELLANEOUS 2 TIMES DAILY
Qty: 1 KIT | Refills: 0 | Status: SHIPPED | OUTPATIENT
Start: 2020-08-05 | End: 2021-04-21 | Stop reason: ALTCHOICE

## 2020-08-05 NOTE — PROGRESS NOTES
Lani Adamson is a 60year old female who presents for a complete physical exam, no gyn.   HPI:     Patient presents with:  Wellness Visit      Patient is 60 y female presents with comprehensive physical. Reports feels well, dental visit up to date, no he occasional        Past Medical History:   Diagnosis Date   • ACTINIC KERATOSIS/dysplastic nevi 10/8/2008    Dr. Silva Forward follows.    • Depression    • Esophageal reflux    • GLUCOSE INTOLERANCE 10/6/2008    HISTORY OF GESTATIONAL DIABETES   • Hashimoto's thyr 2005    tummy tuck   • REDUCTION LEFT  07/30/2018   • REDUCTION RIGHT  07/30/2018   • STRESS ECHO TEST, CARDIO ([facility])  4/6/12    no ischemia   • UPPER GI ENDOSCOPY - REFERRAL  10/6/11    no malignancy, diffuse intramucosal eosinophils      Family History bleeding  ENDOCRINE: denies excessive thirst or urination; denies unexpected wt gain or wt loss  ALLERGY/IMM.: denies food or seasonal allergies  PSYCH: no symptoms of depression or anxiety      EXAM:   /80   Pulse 80   Temp 97.3 °F (36.3 °C) (Oral) ([facility]  -     COMP METABOLIC PANEL (14); Future  -     HEMOGLOBIN A1C; Future  -     LIPID PANEL; Future  -     MICROALB/CREAT RATIO, RANDOM URINE;  Future  -     Blood Glucose Monitoring Suppl (ONETOUCH ULTRA 2) w/Device Does not apply Kit; 1 Device by [employer]

## 2020-08-06 LAB
DEPRECATED HBV CORE AB SER IA-ACNC: 5.7 NG/ML (ref 18–340)
IRON SATURATION: 14 % (ref 15–50)
IRON SERPL-MCNC: 71 UG/DL (ref 50–170)
TOTAL IRON BINDING CAPACITY: 519 UG/DL (ref 240–450)
TRANSFERRIN SERPL-MCNC: 348 MG/DL (ref 200–360)

## 2020-08-07 ENCOUNTER — TELEPHONE (OUTPATIENT)
Dept: INTERNAL MEDICINE CLINIC | Facility: HOSPITAL | Age: 60
End: 2020-08-07

## 2020-08-07 DIAGNOSIS — Z78.9 PARTICIPANT IN HEALTH AND WELLNESS PLAN: Primary | ICD-10-CM

## 2020-08-11 ENCOUNTER — TELEPHONE (OUTPATIENT)
Dept: FAMILY MEDICINE CLINIC | Facility: CLINIC | Age: 60
End: 2020-08-11

## 2020-08-11 DIAGNOSIS — D50.9 IRON DEFICIENCY ANEMIA, UNSPECIFIED IRON DEFICIENCY ANEMIA TYPE: Primary | ICD-10-CM

## 2020-08-11 NOTE — TELEPHONE ENCOUNTER
----- Message from TOSHIA Breen sent at 8/6/2020  9:35 AM CDT -----  Iron deficiency anemia - can take iron daily , increase water intake can cause constipation , will recheck it in 3 months

## 2020-08-13 ENCOUNTER — NURSE ONLY (OUTPATIENT)
Dept: LAB | Age: 60
End: 2020-08-13
Attending: PREVENTIVE MEDICINE
Payer: COMMERCIAL

## 2020-10-08 RX ORDER — ATORVASTATIN CALCIUM 20 MG/1
TABLET, FILM COATED ORAL
Qty: 36 TABLET | Refills: 2 | Status: SHIPPED | OUTPATIENT
Start: 2020-10-08 | End: 2021-04-21

## 2020-10-14 DIAGNOSIS — Z20.822 EXPOSURE TO CONFIRMED CASE OF COVID-19: Primary | ICD-10-CM

## 2020-10-15 RX ORDER — OMEPRAZOLE 40 MG/1
CAPSULE, DELAYED RELEASE ORAL
Qty: 90 CAPSULE | Refills: 3 | Status: SHIPPED | OUTPATIENT
Start: 2020-10-15 | End: 2021-04-21

## 2020-10-22 ENCOUNTER — HOSPITAL ENCOUNTER (OUTPATIENT)
Dept: MAMMOGRAPHY | Age: 60
Discharge: HOME | End: 2020-10-22
Attending: NURSE PRACTITIONER
Payer: COMMERCIAL

## 2020-10-22 DIAGNOSIS — Z12.31 VISIT FOR SCREENING MAMMOGRAM: ICD-10-CM

## 2020-10-22 PROCEDURE — 77067 SCR MAMMO BI INCL CAD: CPT | Performed by: NURSE PRACTITIONER

## 2020-10-22 PROCEDURE — 77063 BREAST TOMOSYNTHESIS BI: CPT | Performed by: NURSE PRACTITIONER

## 2020-10-28 ENCOUNTER — TELEPHONE (OUTPATIENT)
Dept: FAMILY MEDICINE CLINIC | Facility: CLINIC | Age: 60
End: 2020-10-28

## 2020-10-28 NOTE — TELEPHONE ENCOUNTER
----- Message from TOSHIA Cordova sent at 10/28/2020  8:31 AM CDT -----  Benign - repeat Mammogram in 12 months

## 2020-12-18 ENCOUNTER — IMMUNIZATION (OUTPATIENT)
Dept: LAB | Facility: HOSPITAL | Age: 60
End: 2020-12-18
Attending: PREVENTIVE MEDICINE
Payer: COMMERCIAL

## 2020-12-18 DIAGNOSIS — Z23 NEED FOR VACCINATION: ICD-10-CM

## 2020-12-18 PROCEDURE — 0001A PFIZER-BIONTECH COVID-19 VACCINE: CPT

## 2021-01-08 ENCOUNTER — IMMUNIZATION (OUTPATIENT)
Dept: LAB | Facility: HOSPITAL | Age: 61
End: 2021-01-08
Attending: PREVENTIVE MEDICINE

## 2021-01-08 DIAGNOSIS — Z23 NEED FOR VACCINATION: ICD-10-CM

## 2021-01-08 PROCEDURE — 0002A SARSCOV2 VAC 30MCG/0.3ML IM: CPT

## 2021-02-04 ENCOUNTER — PATIENT MESSAGE (OUTPATIENT)
Dept: FAMILY MEDICINE CLINIC | Facility: CLINIC | Age: 61
End: 2021-02-04

## 2021-02-04 DIAGNOSIS — E11.9 DIABETES MELLITUS WITHOUT COMPLICATION (HCC): ICD-10-CM

## 2021-02-05 NOTE — TELEPHONE ENCOUNTER
From: Bonnie Cross  To: Kirill Verde MD  Sent: 2/4/2021 8:16 PM CST  Subject: Prescription Question    Hi Dr Santos Dudley! I miss seeing you in the office. I'm never there on Thursdays. I would like a new prescription for my Metformin HCL 500mg tablets.

## 2021-02-19 ENCOUNTER — PATIENT MESSAGE (OUTPATIENT)
Dept: FAMILY MEDICINE CLINIC | Facility: CLINIC | Age: 61
End: 2021-02-19

## 2021-03-05 ENCOUNTER — MED REC SCAN ONLY (OUTPATIENT)
Dept: FAMILY MEDICINE CLINIC | Facility: CLINIC | Age: 61
End: 2021-03-05

## 2021-04-21 ENCOUNTER — OFFICE VISIT (OUTPATIENT)
Dept: FAMILY MEDICINE CLINIC | Facility: CLINIC | Age: 61
End: 2021-04-21
Payer: COMMERCIAL

## 2021-04-21 ENCOUNTER — LAB ENCOUNTER (OUTPATIENT)
Dept: LAB | Age: 61
End: 2021-04-21
Attending: FAMILY MEDICINE
Payer: COMMERCIAL

## 2021-04-21 VITALS
HEART RATE: 75 BPM | RESPIRATION RATE: 18 BRPM | OXYGEN SATURATION: 96 % | SYSTOLIC BLOOD PRESSURE: 120 MMHG | TEMPERATURE: 97 F | BODY MASS INDEX: 29.64 KG/M2 | WEIGHT: 159 LBS | DIASTOLIC BLOOD PRESSURE: 84 MMHG | HEIGHT: 61.5 IN

## 2021-04-21 DIAGNOSIS — Z13.29 SCREENING FOR ENDOCRINE, NUTRITIONAL, METABOLIC AND IMMUNITY DISORDER: ICD-10-CM

## 2021-04-21 DIAGNOSIS — Z13.0 SCREENING FOR ENDOCRINE, NUTRITIONAL, METABOLIC AND IMMUNITY DISORDER: ICD-10-CM

## 2021-04-21 DIAGNOSIS — R20.0 NUMBNESS: ICD-10-CM

## 2021-04-21 DIAGNOSIS — Z13.228 SCREENING FOR ENDOCRINE, NUTRITIONAL, METABOLIC AND IMMUNITY DISORDER: ICD-10-CM

## 2021-04-21 DIAGNOSIS — Z13.21 SCREENING FOR ENDOCRINE, NUTRITIONAL, METABOLIC AND IMMUNITY DISORDER: ICD-10-CM

## 2021-04-21 DIAGNOSIS — Z00.00 ROUTINE GENERAL MEDICAL EXAMINATION AT A HEALTH CARE FACILITY: Primary | ICD-10-CM

## 2021-04-21 DIAGNOSIS — E11.9 DIABETES MELLITUS WITHOUT COMPLICATION (HCC): ICD-10-CM

## 2021-04-21 DIAGNOSIS — D50.9 IRON DEFICIENCY ANEMIA, UNSPECIFIED IRON DEFICIENCY ANEMIA TYPE: ICD-10-CM

## 2021-04-21 PROCEDURE — 83036 HEMOGLOBIN GLYCOSYLATED A1C: CPT

## 2021-04-21 PROCEDURE — 3008F BODY MASS INDEX DOCD: CPT | Performed by: FAMILY MEDICINE

## 2021-04-21 PROCEDURE — 82570 ASSAY OF URINE CREATININE: CPT

## 2021-04-21 PROCEDURE — 82728 ASSAY OF FERRITIN: CPT

## 2021-04-21 PROCEDURE — 83550 IRON BINDING TEST: CPT

## 2021-04-21 PROCEDURE — 85025 COMPLETE CBC W/AUTO DIFF WBC: CPT

## 2021-04-21 PROCEDURE — 82306 VITAMIN D 25 HYDROXY: CPT

## 2021-04-21 PROCEDURE — 36415 COLL VENOUS BLD VENIPUNCTURE: CPT

## 2021-04-21 PROCEDURE — 83540 ASSAY OF IRON: CPT

## 2021-04-21 PROCEDURE — 3074F SYST BP LT 130 MM HG: CPT | Performed by: FAMILY MEDICINE

## 2021-04-21 PROCEDURE — 82043 UR ALBUMIN QUANTITATIVE: CPT

## 2021-04-21 PROCEDURE — 82607 VITAMIN B-12: CPT

## 2021-04-21 PROCEDURE — 3079F DIAST BP 80-89 MM HG: CPT | Performed by: FAMILY MEDICINE

## 2021-04-21 PROCEDURE — 84443 ASSAY THYROID STIM HORMONE: CPT

## 2021-04-21 PROCEDURE — 99396 PREV VISIT EST AGE 40-64: CPT | Performed by: FAMILY MEDICINE

## 2021-04-21 PROCEDURE — 80053 COMPREHEN METABOLIC PANEL: CPT

## 2021-04-21 PROCEDURE — 80061 LIPID PANEL: CPT

## 2021-04-21 RX ORDER — OMEPRAZOLE 40 MG/1
40 CAPSULE, DELAYED RELEASE ORAL DAILY
Qty: 90 CAPSULE | Refills: 3 | Status: SHIPPED | OUTPATIENT
Start: 2021-04-21

## 2021-04-21 RX ORDER — ATORVASTATIN CALCIUM 20 MG/1
TABLET, FILM COATED ORAL
Qty: 36 TABLET | Refills: 3 | Status: SHIPPED | OUTPATIENT
Start: 2021-04-21

## 2021-04-21 RX ORDER — ESCITALOPRAM OXALATE 5 MG/1
5 TABLET ORAL DAILY
Qty: 90 TABLET | Refills: 3 | Status: SHIPPED | OUTPATIENT
Start: 2021-04-21

## 2021-04-21 NOTE — PROGRESS NOTES
Jazmin Junior is a 63year old female who presents for a complete physical exam, no gyn. HPI:     Patient presents with:  Physical: med refills, diabetes       Patient feels well, dental visit up to date, no hearing problem. Vaccinations up to date.   D Diagnosis Date   • ACTINIC KERATOSIS/dysplastic nevi 10/8/2008    Dr. Denise Haas follows.    • Depression    • Esophageal reflux    • GLUCOSE INTOLERANCE 10/6/2008    HISTORY OF GESTATIONAL DIABETES   • Hashimoto's thyroiditis 1/26/2010   • High cholesterol eosinophils      Family History   Problem Relation Age of Onset   • Hypertension Father    • Diabetes Father    • Stroke Father    • Lipids Father    • Thyroid Disorder Mother         hypothyroidism   • Gastro-Intestinal Disorder Mother         ischemic co anxiety      EXAM:   /84   Pulse 75   Temp 97.3 °F (36.3 °C)   Resp 18   Ht 5' 1.5\" (1.562 m)   Wt 159 lb (72.1 kg)   SpO2 96%   BMI 29.56 kg/m²      General: WD/WN in no acute distress. HEENT: PERRLA and EOMI. OP moist no lesions.   Neck is suppl Standing Status: Future          Standing Expiration Date: 4/21/2022      Comp Metabolic Panel (14) [E]          Standing Status: Future          Standing Expiration Date: 4/21/2022      Hemoglobin A1C [E]          Standing Status: Future          Standing

## 2021-04-21 NOTE — PATIENT INSTRUCTIONS
Biju Harkins M.D. [facility]       [facility]  [address], [address] [place] [address]    Phone: [phone]  Fax: [phone].

## 2021-05-25 ENCOUNTER — TELEPHONE (OUTPATIENT)
Dept: ORTHOPEDICS CLINIC | Facility: CLINIC | Age: 61
End: 2021-05-25

## 2021-05-25 DIAGNOSIS — M25.562 LEFT KNEE PAIN, UNSPECIFIED CHRONICITY: Primary | ICD-10-CM

## 2021-05-25 NOTE — TELEPHONE ENCOUNTER
Patient has an appointment scheduled with Dr. Navid Gu on 6/16 for left knee pain. Will patient need imaging prior to appt?

## 2021-05-25 NOTE — TELEPHONE ENCOUNTER
Reviewed patients chart, xray orders are required. Order placed, patient informed to arrive 30 mins prior to patients appt to complete XR order. Message also sent to patients Uversityhart.

## 2021-06-14 ENCOUNTER — PATIENT MESSAGE (OUTPATIENT)
Dept: ADMINISTRATIVE | Facility: HOSPITAL | Age: 61
End: 2021-06-14

## 2021-06-16 ENCOUNTER — HOSPITAL ENCOUNTER (OUTPATIENT)
Dept: GENERAL RADIOLOGY | Age: 61
Discharge: HOME | End: 2021-06-16
Attending: ORTHOPAEDIC SURGERY
Payer: COMMERCIAL

## 2021-06-16 ENCOUNTER — OFFICE VISIT (OUTPATIENT)
Dept: ORTHOPEDICS CLINIC | Facility: CLINIC | Age: 61
End: 2021-06-16
Payer: COMMERCIAL

## 2021-06-16 VITALS — HEIGHT: 61 IN | WEIGHT: 158.19 LBS | HEART RATE: 76 BPM | BODY MASS INDEX: 29.86 KG/M2 | OXYGEN SATURATION: 98 %

## 2021-06-16 DIAGNOSIS — M25.562 LEFT KNEE PAIN, UNSPECIFIED CHRONICITY: ICD-10-CM

## 2021-06-16 DIAGNOSIS — M17.12 PRIMARY OSTEOARTHRITIS OF LEFT KNEE: Primary | ICD-10-CM

## 2021-06-16 PROCEDURE — 3008F BODY MASS INDEX DOCD: CPT | Performed by: ORTHOPAEDIC SURGERY

## 2021-06-16 PROCEDURE — 73564 X-RAY EXAM KNEE 4 OR MORE: CPT | Performed by: ORTHOPAEDIC SURGERY

## 2021-06-16 PROCEDURE — 99203 OFFICE O/P NEW LOW 30 MIN: CPT | Performed by: ORTHOPAEDIC SURGERY

## 2021-06-16 PROCEDURE — 20610 DRAIN/INJ JOINT/BURSA W/O US: CPT | Performed by: ORTHOPAEDIC SURGERY

## 2021-06-16 RX ORDER — TRIAMCINOLONE ACETONIDE 40 MG/ML
40 INJECTION, SUSPENSION INTRA-ARTICULAR; INTRAMUSCULAR ONCE
Status: COMPLETED | OUTPATIENT
Start: 2021-06-16 | End: 2021-06-16

## 2021-06-16 RX ADMIN — TRIAMCINOLONE ACETONIDE 40 MG: 40 INJECTION, SUSPENSION INTRA-ARTICULAR; INTRAMUSCULAR at 14:54:00

## 2021-06-16 NOTE — H&P
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   Patient reports that the pain is been present for left inguinal  repair   • HERNIA SURGERY  5/20    umbilical hernia repair   • HYSTERECTOMY  6/05    MEENU-BSO   • NICOLAS LOCALIZATION WIRE 1 SITE LEFT (CPT=19281)      AGE 18 LIPOMA   • NICOLAS LOCALIZATION WIRE 1 SITE LEFT (CPT=19281)  02/2001    ADH   • NEEDLE TID     • FISH OIL 1000 MG OR CAPS 1 TABLETS TID     • ASPIRIN 81 MG OR TABS 1 TABLET DAILY     • SLOW FE OR 1 DAILY occasional         Msg [Monosodium Glu*    Tightness in Throat  Adhesive Tape           RASH  Bactrim [Sulfametho*    RASH    Comment:Full plane deformity to the knee  Left lower extremity:  • Inspection: skin intact, no lesions, no effusion  • Palpation: Tender to palpation medial joint line, nontender lateral joint line, positive tenderness about patellofemoral joint more on the lateral azma discussion and would like to continue nonsurgical treatment. She is most interested in repeating steroid injection for the knee. This was performed in clinic today. Advised that we can repeat this as often as every 3 months as needed.   We did discuss th

## 2021-06-16 NOTE — PATIENT INSTRUCTIONS
What Is Arthritis? Arthritis is a disease that affects the joints. Joints are the parts where bones meet and move. It can affect any joint in your body. There are many types of arthritis.  They include:   · Osteoarthritis  · Rheumatoid arthritis  · Gout  · The joint's range of motion can become limited. Symptoms  Arthritis can affect any joint. Weight-bearing joints, such as the hips and knees, are often affected. Common symptoms are joint pain and stiffness.  Pain and stiffness may get worse with inactivi pounds. Losing one single pound takes multiple pounds of pressure off the joints. Losing 10 pounds can take 50 pounds of pressure off the joints. The tips below may help:  · Start a weight-loss program with the help of your healthcare provider.   · Ask your exercise part of your life. Talk with your healthcare provider about what is safe for you. Make sure you:  · Choose exercises that improve joint motion and make your muscles stronger. Learn how to do exercises correctly and safely.  Consider talking with a Isometric exercises are done by tightening the muscles without moving the joint. This may be a good way to strengthen the muscles around a stiff joint. · Avoid deep flexion or deep squatting (do not bend the knee more than 90 degrees).   · Focus on closed- flexibility activities such as yoga and john chi may improve pain and joint motion.  You might try:  · Yoga, including chair yoga, helps to keep your joints strong and flexible  · John Chi, an ancient type of exercise with slow, gentle movements  · Water exer your wrists or other joints  · A brace to support a weak knee joint  · Orthotics for toe and foot involvement    Medical and surgical treatments  Discuss medical treatments with your healthcare provider to help reduce your pain and improve joint mobility. The cartilage is smoothed. Any pieces of cartilage that have broken off are removed. · Total joint replacement. The entire joint is taken out and replaced with a manmade joint using metal, ceramic, or plastic.  This is most often done with the knee or hip

## 2021-07-06 RX ORDER — AMOXICILLIN AND CLAVULANATE POTASSIUM 875; 125 MG/1; MG/1
1 TABLET, FILM COATED ORAL 2 TIMES DAILY
Qty: 14 TABLET | Refills: 0 | Status: SHIPPED | OUTPATIENT
Start: 2021-07-06 | End: 2021-10-27

## 2021-07-26 ENCOUNTER — PATIENT MESSAGE (OUTPATIENT)
Dept: FAMILY MEDICINE CLINIC | Facility: CLINIC | Age: 61
End: 2021-07-26

## 2021-07-27 NOTE — TELEPHONE ENCOUNTER
From: Marina Cross  To: Angel Joyce MD  Sent: 7/26/2021 3:02 PM CDT  Subject: Visit Follow-up Question    Hi Dr Brittnee Salvador,   Just a quick question- can taking antibiotics lead to a UTI? I think I'm on the verge of one.  I took 10 days of antibiotics f

## 2021-08-16 RX ORDER — MOMETASONE FUROATE 1 MG/G
1 CREAM TOPICAL 2 TIMES DAILY PRN
Qty: 60 G | Refills: 0 | Status: SHIPPED | OUTPATIENT
Start: 2021-08-16 | End: 2021-12-28 | Stop reason: ALTCHOICE

## 2021-08-16 RX ORDER — PREDNISONE 20 MG/1
TABLET ORAL
Qty: 18 TABLET | Refills: 0 | Status: SHIPPED | OUTPATIENT
Start: 2021-08-16 | End: 2021-10-27

## 2021-09-13 ENCOUNTER — TELEPHONE (OUTPATIENT)
Dept: INTERNAL MEDICINE CLINIC | Facility: HOSPITAL | Age: 61
End: 2021-09-13

## 2021-09-13 DIAGNOSIS — Z20.822 EXPOSURE TO COVID-19 VIRUS: Primary | ICD-10-CM

## 2021-09-13 NOTE — TELEPHONE ENCOUNTER
Department: PF [place] and [place]                                [x] [facility]  []KARTHIKEYAN   [] [facility]    Dept Manager/Supervisor/team or clinical lead: Aloysius Burkitt    Position:  [] MD     [] RN     [] Respiratory Therapist     [] PCT     [] PSR      [x] Other m when?  Any recent travel: Yes []     No [x]    If yes, location:     What shift do you work? days  When was the last shift you worked? 9/13/21  When are you next scheduled to work?  9/14/21    Did you have close contact with someone on your unit while not w next 14 days. Test w/ Al[facility] 3-5 days after exposure.                                                  COVID-19 testing ordered: [] Rapid    [x] Alinity    Date test is to be taken:   9/13/21     []  Outside testing       [x] Manager

## 2021-09-14 ENCOUNTER — LAB ENCOUNTER (OUTPATIENT)
Dept: LAB | Age: 61
End: 2021-09-14
Attending: PREVENTIVE MEDICINE
Payer: COMMERCIAL

## 2021-09-14 DIAGNOSIS — Z20.822 EXPOSURE TO COVID-19 VIRUS: ICD-10-CM

## 2021-09-16 LAB — SARS-COV-2 RNA RESP QL NAA+PROBE: NOT DETECTED

## 2021-09-30 ENCOUNTER — HOSPITAL ENCOUNTER (OUTPATIENT)
Dept: ULTRASOUND IMAGING | Age: 61
Discharge: HOME | End: 2021-09-30
Attending: OTOLARYNGOLOGY
Payer: COMMERCIAL

## 2021-09-30 DIAGNOSIS — E04.2 NONTOXIC MULTINODULAR GOITER: ICD-10-CM

## 2021-09-30 PROCEDURE — 76536 US EXAM OF HEAD AND NECK: CPT | Performed by: OTOLARYNGOLOGY

## 2021-10-06 ENCOUNTER — IMMUNIZATION (OUTPATIENT)
Dept: LAB | Facility: HOSPITAL | Age: 61
End: 2021-10-06
Attending: EMERGENCY MEDICINE
Payer: COMMERCIAL

## 2021-10-06 DIAGNOSIS — Z23 NEED FOR VACCINATION: Primary | ICD-10-CM

## 2021-10-06 PROCEDURE — 0003A SARSCOV2 VAC 30MCG/0.3ML IM: CPT

## 2021-10-06 PROCEDURE — 0004A SARSCOV2 VAC 30MCG/0.3ML IM: CPT

## 2021-10-27 ENCOUNTER — OFFICE VISIT (OUTPATIENT)
Dept: NEUROLOGY | Facility: CLINIC | Age: 61
End: 2021-10-27
Payer: COMMERCIAL

## 2021-10-27 VITALS
OXYGEN SATURATION: 98 % | HEART RATE: 76 BPM | WEIGHT: 158 LBS | BODY MASS INDEX: 30 KG/M2 | DIASTOLIC BLOOD PRESSURE: 78 MMHG | RESPIRATION RATE: 16 BRPM | SYSTOLIC BLOOD PRESSURE: 118 MMHG

## 2021-10-27 DIAGNOSIS — R20.0 LEFT FACIAL NUMBNESS: Primary | ICD-10-CM

## 2021-10-27 PROCEDURE — 99202 OFFICE O/P NEW SF 15 MIN: CPT | Performed by: HOSPITALIST

## 2021-10-27 PROCEDURE — 3074F SYST BP LT 130 MM HG: CPT | Performed by: HOSPITALIST

## 2021-10-27 PROCEDURE — 3078F DIAST BP <80 MM HG: CPT | Performed by: HOSPITALIST

## 2021-10-27 NOTE — H&P
Neurology H&P    [facility] Patient Status:  No patient class for patient encounter    1960 MRN [id number]   Location ED [facility] Attending No att. providers found   [facility] Day # 0 PCP Sandy Lynn MD     Subjective:  [address] [facility] is C) 1000 MG Oral Tab Take 1,000 mg by mouth daily.      • Blood Glucose Monitoring Suppl (ONETOUCH ULTRA 2) w/Device Does not apply Kit Use to test blood glucose once daily 1 kit 0   • VITAMIN E daily     • VITAMIN D 1000 UNIT OR CAPS 2000 IU daily     • MUL 10/6/2008   • Postmenopausal atrophic vaginitis 10/8/2008   • Type II or unspecified type diabetes mellitus without mention of complication, not stated as uncontrolled    • Unspecified disorder of thyroid     nodule to be biopsied 5/1/15   • Valvular disea Father    • Thyroid Disorder Mother         hypothyroidism   • Gastro-Intestinal Disorder Mother         ischemic colitis   • Hypertension Mother    • Diabetes Paternal Grandmother    • Diabetes Sister         DM I   • Thyroid Disorder Sister         hypot normal.     Gait, Coordination, and Reflexes     Gait  Gait: normal    Coordination   Romberg: negative  Finger to nose coordination: normal  Heel to shin coordination: normal  Tandem walking coordination: normal    Tremor   Resting tremor: absent  Intenti

## 2021-10-29 ENCOUNTER — TELEPHONE (OUTPATIENT)
Dept: FAMILY MEDICINE CLINIC | Facility: CLINIC | Age: 61
End: 2021-10-29

## 2021-10-29 DIAGNOSIS — Z12.31 ENCOUNTER FOR SCREENING MAMMOGRAM FOR MALIGNANT NEOPLASM OF BREAST: Primary | ICD-10-CM

## 2021-10-29 NOTE — TELEPHONE ENCOUNTER
Pt requesting Lainey order be placed. Order placed in [facility] [address]

## 2021-10-30 DIAGNOSIS — E11.9 DIABETES MELLITUS WITHOUT COMPLICATION (HCC): ICD-10-CM

## 2021-11-01 RX ORDER — BLOOD SUGAR DIAGNOSTIC
STRIP MISCELLANEOUS
Qty: 100 STRIP | Refills: 3 | Status: SHIPPED | OUTPATIENT
Start: 2021-11-01

## 2021-11-30 ENCOUNTER — HOSPITAL ENCOUNTER (OUTPATIENT)
Dept: MRI IMAGING | Age: 61
Discharge: HOME | End: 2021-11-30
Attending: HOSPITALIST
Payer: COMMERCIAL

## 2021-11-30 ENCOUNTER — TELEPHONE (OUTPATIENT)
Dept: FAMILY MEDICINE CLINIC | Facility: CLINIC | Age: 61
End: 2021-11-30

## 2021-11-30 ENCOUNTER — HOSPITAL ENCOUNTER (OUTPATIENT)
Dept: MAMMOGRAPHY | Age: 61
Discharge: HOME | End: 2021-11-30
Attending: FAMILY MEDICINE
Payer: COMMERCIAL

## 2021-11-30 DIAGNOSIS — R20.0 LEFT FACIAL NUMBNESS: ICD-10-CM

## 2021-11-30 DIAGNOSIS — Z12.31 ENCOUNTER FOR SCREENING MAMMOGRAM FOR MALIGNANT NEOPLASM OF BREAST: ICD-10-CM

## 2021-11-30 PROCEDURE — 77063 BREAST TOMOSYNTHESIS BI: CPT | Performed by: FAMILY MEDICINE

## 2021-11-30 PROCEDURE — 70551 MRI BRAIN STEM W/O DYE: CPT | Performed by: HOSPITALIST

## 2021-11-30 PROCEDURE — 77067 SCR MAMMO BI INCL CAD: CPT | Performed by: FAMILY MEDICINE

## 2021-12-28 ENCOUNTER — OFFICE VISIT (OUTPATIENT)
Dept: NEUROLOGY | Facility: CLINIC | Age: 61
End: 2021-12-28
Payer: COMMERCIAL

## 2021-12-28 VITALS
HEART RATE: 86 BPM | OXYGEN SATURATION: 98 % | BODY MASS INDEX: 32 KG/M2 | SYSTOLIC BLOOD PRESSURE: 112 MMHG | DIASTOLIC BLOOD PRESSURE: 68 MMHG | WEIGHT: 168 LBS

## 2021-12-28 DIAGNOSIS — R20.0 FACIAL NUMBNESS: Primary | ICD-10-CM

## 2021-12-28 PROCEDURE — 99213 OFFICE O/P EST LOW 20 MIN: CPT | Performed by: HOSPITALIST

## 2021-12-28 PROCEDURE — 3078F DIAST BP <80 MM HG: CPT | Performed by: HOSPITALIST

## 2021-12-28 PROCEDURE — 3074F SYST BP LT 130 MM HG: CPT | Performed by: HOSPITALIST

## 2021-12-28 RX ORDER — ASCORBIC ACID 500 MG
1 TABLET ORAL DAILY
COMMUNITY

## 2021-12-28 NOTE — PROGRESS NOTES
Subjective:   Allegra Holliday is a 57year old female who presents for Numbness     Patient presents in follow-up due to intermittent left-sided facial numbness. Since previous visit, her symptoms continue.   MRI of her brain with and without contrast was la CHEW 1 TABLET TID     • FISH OIL 1000 MG OR CAPS 1 TABLETS TID     • ASPIRIN 81 MG OR TABS 1 TABLET DAILY     • SLOW FE OR 1 DAILY occasional         Review of Systems:  Review of Systems   Constitutional: Negative. HENT: Negative. Eyes: Negative.

## 2022-01-05 ENCOUNTER — TELEPHONE (OUTPATIENT)
Dept: INTERNAL MEDICINE CLINIC | Facility: HOSPITAL | Age: 62
End: 2022-01-05

## 2022-01-05 DIAGNOSIS — Z11.52 ENCOUNTER FOR SCREENING FOR COVID-19: Primary | ICD-10-CM

## 2022-01-06 ENCOUNTER — NURSE ONLY (OUTPATIENT)
Dept: LAB | Facility: HOSPITAL | Age: 62
End: 2022-01-06
Attending: PREVENTIVE MEDICINE

## 2022-01-06 DIAGNOSIS — Z11.52 ENCOUNTER FOR SCREENING FOR COVID-19: ICD-10-CM

## 2022-01-06 LAB — SARS-COV-2 RNA RESP QL NAA+PROBE: NOT DETECTED

## 2022-01-06 NOTE — TELEPHONE ENCOUNTER
Department: Mammography/X-ray                                 [x] [facility]  []KARTHIKEYAN   [] [address] [facility]      Dept Manager/Supervisor/team or clinical lead: Garfield Connor    Position:  [] MD     [] RN     [] Respiratory Therapist     [] PCT     [] PSR      [] Rolly Goldmann     [] MA [x shift you worked? 1/4/22  When are you next scheduled to work? 1/6/22    Did you have close contact with someone on your unit while not wearing a mask? (e.g., during meal breaks):  Yes []   No [x]    If yes, who:   Do you share a workspace?  Yes []   No [x] 0)    [x]      On day 5, if asymptomatic or mildly symptomatic (with improving symptoms) may return to work day 6  [x]      On day 5, if symptomatic, call Employee Health for RTW screening        [x]  Plan noted above  []  Length of time to obtain results

## 2022-01-27 NOTE — TELEPHONE ENCOUNTER
Negative rapid testing resulted-no further testing needed, patient past quarantine time. Will notify manager.

## 2022-03-02 ENCOUNTER — PROCEDURE VISIT (OUTPATIENT)
Dept: NEUROLOGY | Facility: CLINIC | Age: 62
End: 2022-03-02
Payer: COMMERCIAL

## 2022-03-02 DIAGNOSIS — R20.0 FACIAL NUMBNESS: ICD-10-CM

## 2022-04-20 ENCOUNTER — TELEPHONE (OUTPATIENT)
Dept: INTERNAL MEDICINE CLINIC | Facility: HOSPITAL | Age: 62
End: 2022-04-20

## 2022-04-20 ENCOUNTER — VIRTUAL PHONE E/M (OUTPATIENT)
Dept: FAMILY MEDICINE CLINIC | Facility: CLINIC | Age: 62
End: 2022-04-20
Payer: COMMERCIAL

## 2022-04-20 ENCOUNTER — LAB ENCOUNTER (OUTPATIENT)
Dept: LAB | Facility: HOSPITAL | Age: 62
End: 2022-04-20
Attending: PREVENTIVE MEDICINE
Payer: COMMERCIAL

## 2022-04-20 ENCOUNTER — MOBILE ENCOUNTER (OUTPATIENT)
Dept: FAMILY MEDICINE CLINIC | Facility: CLINIC | Age: 62
End: 2022-04-20

## 2022-04-20 DIAGNOSIS — Z20.822 SUSPECTED COVID-19 VIRUS INFECTION: ICD-10-CM

## 2022-04-20 DIAGNOSIS — U07.1 COVID-19: Primary | ICD-10-CM

## 2022-04-20 LAB — SARS-COV-2 RNA RESP QL NAA+PROBE: DETECTED

## 2022-04-20 PROCEDURE — 99441 PHONE E/M BY PHYS 5-10 MIN: CPT | Performed by: NURSE PRACTITIONER

## 2022-04-20 RX ORDER — BENZONATATE 200 MG/1
200 CAPSULE ORAL 3 TIMES DAILY PRN
Qty: 60 CAPSULE | Refills: 0 | Status: SHIPPED | OUTPATIENT
Start: 2022-04-20

## 2022-04-20 RX ORDER — PREDNISONE 20 MG/1
TABLET ORAL
Qty: 9 TABLET | Refills: 0 | Status: SHIPPED | OUTPATIENT
Start: 2022-04-20

## 2022-04-20 RX ORDER — AZITHROMYCIN 250 MG/1
TABLET, FILM COATED ORAL
Qty: 6 TABLET | Refills: 0 | Status: SHIPPED | OUTPATIENT
Start: 2022-04-20 | End: 2022-04-25

## 2022-04-20 RX ORDER — MOLNUPIRAVIR 200 MG/1
800 CAPSULE ORAL EVERY 12 HOURS
Qty: 40 CAPSULE | Refills: 0 | Status: SHIPPED | OUTPATIENT
Start: 2022-04-20 | End: 2022-04-25

## 2022-04-20 NOTE — PROGRESS NOTES
Virtual Telephone Check-In    Pavan Palencia verbally consents to a Virtual/Telephone Check-In visit on 04/20/22. Patient has been referred to the [facility] website at [email or web address] to review the yearly Consent to Treat document. Patient understands and accepts financial responsibility for any deductible, co-insurance and/or co-pays associated with this service. Duration of the service: 5-10 minutes      Summary of topics discussed: Covid       COVID Note    Subjective:  Pavan Palencia is currently Confirmed for COVID-19. Presenting symptoms: fever, chills, cough, nasal congestion, loss of smell and loss of taste  she denies sore throat, shortness of breath, diarrhea and nausea and vomiting  Symptoms began on  4/20/22. Exposure source: member of household    COVID Test Result: Positive    Objective:  Exam  General : AAOx3, speech is clear , in no acute distress                  Assessment/Plan    Diagnoses and all orders for this visit:    COVID-19  -     predniSONE 20 MG Oral Tab; 40mg po qd for 3 days, then 20mg po qd for 3 days  -     benzonatate 200 MG Oral Cap; Take 1 capsule (200 mg total) by mouth 3 (three) times daily as needed for cough. -     azithromycin (ZITHROMAX Z-SUDARSHAN) 250 MG Oral Tab; Take 2 tablets (500 mg total) by mouth daily for 1 day, THEN 1 tablet (250 mg total) daily for 4 days. -     molnupiravir 200 MG Oral capsule; Take 800 mg by mouth every 12 (twelve) hours for 5 days. isolate away from any household members as much as possible and the general public COMPLETELY for 3 days after symptoms resolve, or 7 days total (whichever is longer). If you can use an isolated bathroom that would be ideal. If you develop chest pain, altered mental status/confusion, or if unable to speak full sentence due to shortness of breath, should to come to emergency department. If able, please call ahead to let the hospital know you are on your way.  Unless contraindicated due to chronic illness, may take Tylenol 650 mg every 6 hours as needed for pain/temperature. Do not take any OTC anti-inflammatories (ibuprofen, naproxen, aspirin, etc). Please get plenty of rest and increase your intake of oral fluids. If you have any further questions, please contact [address] [place] [address] at [phone].         TOSHIA Barnett

## 2022-04-20 NOTE — TELEPHONE ENCOUNTER
Discussed positive results, RTW instructions, quarantine and masking instructions. Employee voiced understanding.

## 2022-04-20 NOTE — TELEPHONE ENCOUNTER
Results and RTW guidelines:    COVID RESULT:    [x] Viewed by employee in [address] RTW plan and instructions as indicated on triage call. Manager notified. Estimated RTW date: 04/25/22  [] Discussed with employee   [x] Unable to reach by phone. Sent via [employer] message      Test type:    [x] Rapid         [] Alinity         [] Outside test:        [x] Positive  Is employee unvaccinated? Yes []   No [x]          If yes:  Enter Covid Positive Medical exemption on Exemption Spreadsheet    Did you have close contact with someone on your unit while not wearing a mask? (e.g., during meal breaks):  Yes []   No [x]     If yes, who:     - Employee should quarantine at home for at least 5 days (day 1 is day after sx onset) , follow the CDC guidelines for cleaning and                              quarantining; see CDC.gov   -This employee may RTW on day 6 if asymptomatic or mildly symptomatic (with improving symptoms). Call Employee Health on day 5 if unable to return on day 6 after                      symptom onset.    -This employee needs to call [employer] on day 5 after symptom onset. The employee needs to be cleared by [employer]. - Monitor symptoms and temperature                 - Notify PCP of result                 - Seek emergent care with worsening symptoms   - If employee is still experiencing severe symptoms on day 5 must make a RTW appt with [employer], Employee will not be cleared if:    1. Has consistent cough, shortness of breath or fatigue that restricts your physical activities    2. Is still feeling \"unwell\"    3. Within 15 days of hospitalization for COVID    4. Within 20 days of intubation for COVID    5.  Still has a fever, vomiting or diarrhea   - Keep communication open with management about RTW and if symptoms worsen                - If outside testing completed, bring a copy of result to RTW appointment           Notes:     RTW PLAN:    [x]  If COVID positive results, off work minimum of 5 days from positive test or onset of symptoms (day 0)        On day 5, if asymptomatic or mildly symptomatic (with improving symptoms) may return to work day 6          On day 5, if symptomatic, call Employee Health for RTW screening        []  COVID positive result - call Employee Health on day 5 after symptom onset. The employee needs to be cleared by Employee Health to RTW. [] RTW immediately, continue to monitor for sx  [] RTW when sx improve; must be fever free for 24 hours w/o medications, Diarrhea/Vomiting free for 24 hours w/o medications  [] Alinity ordered; continue to monitor sx and call for new/worsening sx.   Discuss RTW guidelines with manager  [] May continue to work  [] Follow up with PCP  [] Home until further instruction from hotline with Alinity results  INSTRUCTIONS PROVIDED:  [x]  Plan as noted above  []  Length of time to obtain results   [x]  Quarantine instructions  [x]  Masking protocol   [x]  S/S of worsening infection/condition and importance of prompt medical re-evaluation including when to seek emergency care  [] If symptoms develop, stay home and call hotline for rapid test order    Estimated RTW date:  04/25/22    [] The employee voiced understanding of above plan/instructions  [x] Manager Notified

## 2022-04-28 RX ORDER — ATORVASTATIN CALCIUM 20 MG/1
TABLET, FILM COATED ORAL
Qty: 36 TABLET | Refills: 0 | Status: SHIPPED | OUTPATIENT
Start: 2022-04-28

## 2022-05-03 RX ORDER — ESCITALOPRAM OXALATE 5 MG/1
TABLET ORAL
Qty: 90 TABLET | Refills: 3 | Status: SHIPPED | OUTPATIENT
Start: 2022-05-03

## 2022-05-03 NOTE — TELEPHONE ENCOUNTER
LOV- 4/20/2022 video, covid    Last annual phy-4/21/2021    RF- 4/21/2021 for 1 year    Patient is over due for annual physical, MyChart message sent requesting appointment.

## 2022-05-11 ENCOUNTER — TELEPHONE (OUTPATIENT)
Dept: FAMILY MEDICINE CLINIC | Facility: CLINIC | Age: 62
End: 2022-05-11

## 2022-05-11 NOTE — TELEPHONE ENCOUNTER
----- Message from Mellisa Denis MD sent at 5/11/2022  3:01 PM CDT -----  Please tell Anil Sames our radiology tech to do labs for DM and see me soon, all albs for physical and DM including urine micro. Thanks.

## 2022-05-14 RX ORDER — ESCITALOPRAM OXALATE 5 MG/1
5 TABLET ORAL DAILY
Qty: 90 TABLET | Refills: 3 | OUTPATIENT
Start: 2022-05-14

## 2022-06-09 ENCOUNTER — LAB ENCOUNTER (OUTPATIENT)
Dept: LAB | Facility: HOSPITAL | Age: 62
End: 2022-06-09
Attending: PREVENTIVE MEDICINE
Payer: COMMERCIAL

## 2022-06-09 ENCOUNTER — TELEPHONE (OUTPATIENT)
Dept: INTERNAL MEDICINE CLINIC | Facility: HOSPITAL | Age: 62
End: 2022-06-09

## 2022-06-09 DIAGNOSIS — Z20.822 SUSPECTED COVID-19 VIRUS INFECTION: ICD-10-CM

## 2022-06-09 DIAGNOSIS — Z20.822 SUSPECTED COVID-19 VIRUS INFECTION: Primary | ICD-10-CM

## 2022-06-09 LAB — SARS-COV-2 RNA RESP QL NAA+PROBE: NOT DETECTED

## 2022-06-20 ENCOUNTER — OFFICE VISIT (OUTPATIENT)
Dept: FAMILY MEDICINE CLINIC | Facility: CLINIC | Age: 62
End: 2022-06-20
Payer: COMMERCIAL

## 2022-06-20 ENCOUNTER — HOSPITAL ENCOUNTER (OUTPATIENT)
Dept: GENERAL RADIOLOGY | Age: 62
Discharge: HOME | End: 2022-06-20
Attending: FAMILY MEDICINE
Payer: COMMERCIAL

## 2022-06-20 ENCOUNTER — LAB ENCOUNTER (OUTPATIENT)
Dept: LAB | Age: 62
End: 2022-06-20
Attending: FAMILY MEDICINE
Payer: COMMERCIAL

## 2022-06-20 VITALS
HEIGHT: 61 IN | HEART RATE: 95 BPM | WEIGHT: 162 LBS | OXYGEN SATURATION: 99 % | RESPIRATION RATE: 16 BRPM | BODY MASS INDEX: 30.58 KG/M2

## 2022-06-20 DIAGNOSIS — E11.9 DIABETES MELLITUS WITHOUT COMPLICATION (HCC): Primary | ICD-10-CM

## 2022-06-20 DIAGNOSIS — Z00.00 ROUTINE GENERAL MEDICAL EXAMINATION AT A HEALTH CARE FACILITY: ICD-10-CM

## 2022-06-20 DIAGNOSIS — M79.672 LEFT FOOT PAIN: ICD-10-CM

## 2022-06-20 DIAGNOSIS — E11.9 DIABETES MELLITUS WITHOUT COMPLICATION (HCC): ICD-10-CM

## 2022-06-20 LAB
ALBUMIN SERPL-MCNC: 3.7 G/DL (ref 3.4–5)
ALBUMIN/GLOB SERPL: 1.2 {RATIO} (ref 1–2)
ALP LIVER SERPL-CCNC: 92 U/L
ALT SERPL-CCNC: 25 U/L
ANION GAP SERPL CALC-SCNC: 5 MMOL/L (ref 0–18)
AST SERPL-CCNC: 13 U/L (ref 15–37)
BASOPHILS # BLD AUTO: 0.04 X10(3) UL (ref 0–0.2)
BASOPHILS NFR BLD AUTO: 0.7 %
BILIRUB SERPL-MCNC: 0.5 MG/DL (ref 0.1–2)
BUN BLD-MCNC: 18 MG/DL (ref 7–18)
CALCIUM BLD-MCNC: 9 MG/DL (ref 8.5–10.1)
CHLORIDE SERPL-SCNC: 107 MMOL/L (ref 98–112)
CHOLEST SERPL-MCNC: 152 MG/DL (ref ?–200)
CO2 SERPL-SCNC: 28 MMOL/L (ref 21–32)
CREAT BLD-MCNC: 0.62 MG/DL
CREAT UR-SCNC: 196 MG/DL
EOSINOPHIL # BLD AUTO: 0.16 X10(3) UL (ref 0–0.7)
EOSINOPHIL NFR BLD AUTO: 2.7 %
ERYTHROCYTE [DISTWIDTH] IN BLOOD BY AUTOMATED COUNT: 12.9 %
EST. AVERAGE GLUCOSE BLD GHB EST-MCNC: 160 MG/DL (ref 68–126)
FASTING PATIENT LIPID ANSWER: YES
FASTING STATUS PATIENT QL REPORTED: YES
GLOBULIN PLAS-MCNC: 3.1 G/DL (ref 2.8–4.4)
GLUCOSE BLD-MCNC: 138 MG/DL (ref 70–99)
HBA1C MFR BLD: 7.2 % (ref ?–5.7)
HCT VFR BLD AUTO: 35.3 %
HDLC SERPL-MCNC: 56 MG/DL (ref 40–59)
HGB BLD-MCNC: 11.1 G/DL
IMM GRANULOCYTES # BLD AUTO: 0.02 X10(3) UL (ref 0–1)
IMM GRANULOCYTES NFR BLD: 0.3 %
LDLC SERPL CALC-MCNC: 73 MG/DL (ref ?–100)
LYMPHOCYTES # BLD AUTO: 2.38 X10(3) UL (ref 1–4)
LYMPHOCYTES NFR BLD AUTO: 39.5 %
MCH RBC QN AUTO: 31 PG (ref 26–34)
MCHC RBC AUTO-ENTMCNC: 31.4 G/DL (ref 31–37)
MCV RBC AUTO: 98.6 FL
MICROALBUMIN UR-MCNC: 1.82 MG/DL
MICROALBUMIN/CREAT 24H UR-RTO: 9.3 UG/MG (ref ?–30)
MONOCYTES # BLD AUTO: 0.41 X10(3) UL (ref 0.1–1)
MONOCYTES NFR BLD AUTO: 6.8 %
NEUTROPHILS # BLD AUTO: 3.01 X10 (3) UL (ref 1.5–7.7)
NEUTROPHILS # BLD AUTO: 3.01 X10(3) UL (ref 1.5–7.7)
NEUTROPHILS NFR BLD AUTO: 50 %
NONHDLC SERPL-MCNC: 96 MG/DL (ref ?–130)
OSMOLALITY SERPL CALC.SUM OF ELEC: 294 MOSM/KG (ref 275–295)
PLATELET # BLD AUTO: 312 10(3)UL (ref 150–450)
POTASSIUM SERPL-SCNC: 3.9 MMOL/L (ref 3.5–5.1)
PROT SERPL-MCNC: 6.8 G/DL (ref 6.4–8.2)
RBC # BLD AUTO: 3.58 X10(6)UL
SODIUM SERPL-SCNC: 140 MMOL/L (ref 136–145)
TRIGL SERPL-MCNC: 129 MG/DL (ref 30–149)
TSI SER-ACNC: 0.57 MIU/ML (ref 0.36–3.74)
VIT D+METAB SERPL-MCNC: 80.3 NG/ML (ref 30–100)
VLDLC SERPL CALC-MCNC: 20 MG/DL (ref 0–30)
WBC # BLD AUTO: 6 X10(3) UL (ref 4–11)

## 2022-06-20 PROCEDURE — 3008F BODY MASS INDEX DOCD: CPT | Performed by: FAMILY MEDICINE

## 2022-06-20 PROCEDURE — 85025 COMPLETE CBC W/AUTO DIFF WBC: CPT

## 2022-06-20 PROCEDURE — 73630 X-RAY EXAM OF FOOT: CPT | Performed by: FAMILY MEDICINE

## 2022-06-20 PROCEDURE — 83036 HEMOGLOBIN GLYCOSYLATED A1C: CPT

## 2022-06-20 PROCEDURE — 80053 COMPREHEN METABOLIC PANEL: CPT

## 2022-06-20 PROCEDURE — 82043 UR ALBUMIN QUANTITATIVE: CPT

## 2022-06-20 PROCEDURE — 84443 ASSAY THYROID STIM HORMONE: CPT

## 2022-06-20 PROCEDURE — 36415 COLL VENOUS BLD VENIPUNCTURE: CPT

## 2022-06-20 PROCEDURE — 3061F NEG MICROALBUMINURIA REV: CPT | Performed by: FAMILY MEDICINE

## 2022-06-20 PROCEDURE — 3051F HG A1C>EQUAL 7.0%<8.0%: CPT | Performed by: FAMILY MEDICINE

## 2022-06-20 PROCEDURE — 99214 OFFICE O/P EST MOD 30 MIN: CPT | Performed by: FAMILY MEDICINE

## 2022-06-20 PROCEDURE — 82570 ASSAY OF URINE CREATININE: CPT

## 2022-06-20 PROCEDURE — 82306 VITAMIN D 25 HYDROXY: CPT

## 2022-06-20 PROCEDURE — 80061 LIPID PANEL: CPT

## 2022-06-20 RX ORDER — ESCITALOPRAM OXALATE 5 MG/1
5 TABLET ORAL DAILY
Qty: 90 TABLET | Refills: 3 | Status: SHIPPED | OUTPATIENT
Start: 2022-06-20

## 2022-06-21 ENCOUNTER — TELEPHONE (OUTPATIENT)
Dept: FAMILY MEDICINE CLINIC | Facility: CLINIC | Age: 62
End: 2022-06-21

## 2022-06-21 DIAGNOSIS — S92.902A: Primary | ICD-10-CM

## 2022-06-21 NOTE — TELEPHONE ENCOUNTER
----- Message from Elizabeth Malik MD sent at 6/21/2022  1:40 PM CDT -----  No fracture but I will get second opinion on this      PRAMOD TrujilloP.M.       [address], [place], [address] [facility]  Phone: [phone]         [address]  [place], [address] [facility]   Phone: [phone]

## 2022-06-22 ENCOUNTER — OFFICE VISIT (OUTPATIENT)
Dept: FAMILY MEDICINE CLINIC | Facility: CLINIC | Age: 62
End: 2022-06-22
Payer: COMMERCIAL

## 2022-06-22 VITALS
HEIGHT: 61 IN | BODY MASS INDEX: 30.4 KG/M2 | HEART RATE: 99 BPM | OXYGEN SATURATION: 98 % | RESPIRATION RATE: 16 BRPM | WEIGHT: 161 LBS | SYSTOLIC BLOOD PRESSURE: 122 MMHG | DIASTOLIC BLOOD PRESSURE: 82 MMHG

## 2022-06-22 DIAGNOSIS — Z00.00 ROUTINE GENERAL MEDICAL EXAMINATION AT A HEALTH CARE FACILITY: Primary | ICD-10-CM

## 2022-06-22 DIAGNOSIS — E11.9 TYPE 2 DIABETES MELLITUS WITHOUT COMPLICATION, WITHOUT LONG-TERM CURRENT USE OF INSULIN (HCC): ICD-10-CM

## 2022-06-22 PROCEDURE — 3074F SYST BP LT 130 MM HG: CPT | Performed by: FAMILY MEDICINE

## 2022-06-22 PROCEDURE — 99396 PREV VISIT EST AGE 40-64: CPT | Performed by: FAMILY MEDICINE

## 2022-06-22 PROCEDURE — 3079F DIAST BP 80-89 MM HG: CPT | Performed by: FAMILY MEDICINE

## 2022-06-22 PROCEDURE — 3008F BODY MASS INDEX DOCD: CPT | Performed by: FAMILY MEDICINE

## 2022-06-22 RX ORDER — OMEPRAZOLE 40 MG/1
40 CAPSULE, DELAYED RELEASE ORAL DAILY
Qty: 90 CAPSULE | Refills: 3 | Status: SHIPPED | OUTPATIENT
Start: 2022-06-22 | End: 2022-06-27 | Stop reason: DRUGHIGH

## 2022-06-22 RX ORDER — ATORVASTATIN CALCIUM 20 MG/1
20 TABLET, FILM COATED ORAL
Qty: 36 TABLET | Refills: 2 | Status: SHIPPED | OUTPATIENT
Start: 2022-06-22

## 2022-06-22 NOTE — PATIENT INSTRUCTIONS
Leonard Jaeger, VINICIUS.P.M.       [address], [place], [address]  Phone: [phone]         [address]  [place], [address]   Phone: [phone]

## 2022-06-27 PROBLEM — N62 BREAST HYPERTROPHY IN FEMALE: Status: ACTIVE | Noted: 2018-03-30

## 2022-08-03 ENCOUNTER — OFFICE VISIT (OUTPATIENT)
Dept: PODIATRY CLINIC | Facility: CLINIC | Age: 62
End: 2022-08-03
Payer: COMMERCIAL

## 2022-08-03 DIAGNOSIS — M72.2 PLANTAR FASCIITIS OF LEFT FOOT: Primary | ICD-10-CM

## 2022-08-03 DIAGNOSIS — M79.672 ARCH PAIN, LEFT: ICD-10-CM

## 2022-08-03 DIAGNOSIS — M77.32 CALCANEAL SPUR OF LEFT FOOT: ICD-10-CM

## 2022-08-03 PROCEDURE — 20550 NJX 1 TENDON SHEATH/LIGAMENT: CPT | Performed by: PODIATRIST

## 2022-08-03 PROCEDURE — 99203 OFFICE O/P NEW LOW 30 MIN: CPT | Performed by: PODIATRIST

## 2022-08-03 RX ORDER — TRIAMCINOLONE ACETONIDE 40 MG/ML
40 INJECTION, SUSPENSION INTRA-ARTICULAR; INTRAMUSCULAR ONCE
Status: COMPLETED | OUTPATIENT
Start: 2022-08-03 | End: 2022-08-03

## 2022-08-04 NOTE — PROGRESS NOTES
Per Dr. Hortencia Greenwood to draw up 1ml of Kenalog 40 and 1ml of 0.5% Marcaine for injection to left foot.

## 2022-08-17 ENCOUNTER — OFFICE VISIT (OUTPATIENT)
Dept: PODIATRY CLINIC | Facility: CLINIC | Age: 62
End: 2022-08-17
Payer: COMMERCIAL

## 2022-08-17 DIAGNOSIS — M72.2 PLANTAR FASCIITIS OF LEFT FOOT: ICD-10-CM

## 2022-08-17 DIAGNOSIS — M77.32 CALCANEAL SPUR OF LEFT FOOT: Primary | ICD-10-CM

## 2022-08-17 DIAGNOSIS — M79.672 ARCH PAIN, LEFT: ICD-10-CM

## 2022-09-07 ENCOUNTER — TELEPHONE (OUTPATIENT)
Dept: PODIATRY CLINIC | Facility: CLINIC | Age: 62
End: 2022-09-07

## 2022-09-07 ENCOUNTER — PATIENT MESSAGE (OUTPATIENT)
Dept: ADMINISTRATIVE | Facility: HOSPITAL | Age: 62
End: 2022-09-07

## 2022-09-07 DIAGNOSIS — M25.572 PAIN OF JOINT OF LEFT ANKLE AND FOOT: Primary | ICD-10-CM

## 2022-09-12 RX ORDER — ATORVASTATIN CALCIUM 20 MG/1
20 TABLET, FILM COATED ORAL
Qty: 36 TABLET | Refills: 0 | Status: SHIPPED | OUTPATIENT
Start: 2022-09-12

## 2022-09-18 ENCOUNTER — LAB ENCOUNTER (OUTPATIENT)
Dept: LAB | Facility: HOSPITAL | Age: 62
End: 2022-09-18
Attending: INTERNAL MEDICINE

## 2022-09-18 DIAGNOSIS — Z01.818 PRE-OP TESTING: ICD-10-CM

## 2022-09-19 ENCOUNTER — HOSPITAL ENCOUNTER (OUTPATIENT)
Dept: GENERAL RADIOLOGY | Age: 62
Discharge: HOME | End: 2022-09-19
Attending: FAMILY MEDICINE

## 2022-09-19 DIAGNOSIS — M25.572 PAIN OF JOINT OF LEFT ANKLE AND FOOT: ICD-10-CM

## 2022-09-19 LAB — SARS-COV-2 RNA RESP QL NAA+PROBE: NOT DETECTED

## 2022-09-19 PROCEDURE — 73630 X-RAY EXAM OF FOOT: CPT | Performed by: FAMILY MEDICINE

## 2022-09-19 PROCEDURE — 73610 X-RAY EXAM OF ANKLE: CPT | Performed by: FAMILY MEDICINE

## 2022-09-20 ENCOUNTER — TELEPHONE (OUTPATIENT)
Dept: FAMILY MEDICINE CLINIC | Facility: CLINIC | Age: 62
End: 2022-09-20

## 2022-09-20 DIAGNOSIS — M79.672 LEFT FOOT PAIN: Primary | ICD-10-CM

## 2022-09-20 NOTE — TELEPHONE ENCOUNTER
Detailed voicemail left regarding results and recommendations as noted below. [employer] message sent to provide Referral contact info for ease of scheduling.

## 2022-09-20 NOTE — TELEPHONE ENCOUNTER
----- Message from Seferino Johnson MD sent at 9/20/2022  3:36 PM CDT -----  Old trauma. Spurs and OA, ok to see          PRAMOD FairbanksP.M.       [address], [place], [address] [facility]  Phone: [phone]         [address]  [facility], [address] [place] [address]   Phone: [phone]

## 2022-09-21 PROBLEM — R13.10 DYSPHAGIA, UNSPECIFIED: Status: ACTIVE | Noted: 2022-09-21

## 2022-09-21 PROBLEM — Z86.010 PERSONAL HISTORY OF COLONIC POLYPS: Status: ACTIVE | Noted: 2022-09-21

## 2022-09-21 PROBLEM — R12 CHRONIC HEARTBURN: Status: ACTIVE | Noted: 2022-09-21

## 2022-09-21 PROBLEM — Z86.0100 PERSONAL HISTORY OF COLONIC POLYPS: Status: ACTIVE | Noted: 2022-09-21

## 2022-10-28 ENCOUNTER — TELEPHONE (OUTPATIENT)
Dept: FAMILY MEDICINE CLINIC | Facility: CLINIC | Age: 62
End: 2022-10-28

## 2022-10-28 DIAGNOSIS — Z12.31 ENCOUNTER FOR SCREENING MAMMOGRAM FOR MALIGNANT NEOPLASM OF BREAST: Primary | ICD-10-CM

## 2022-10-28 RX ORDER — OMEPRAZOLE 40 MG/1
40 CAPSULE, DELAYED RELEASE ORAL DAILY
Qty: 90 CAPSULE | Refills: 1 | Status: SHIPPED | OUTPATIENT
Start: 2022-10-28

## 2022-11-21 ENCOUNTER — HOSPITAL ENCOUNTER (OUTPATIENT)
Dept: MAMMOGRAPHY | Age: 62
Discharge: HOME | End: 2022-11-21
Attending: FAMILY MEDICINE
Payer: COMMERCIAL

## 2022-11-21 DIAGNOSIS — Z12.31 ENCOUNTER FOR SCREENING MAMMOGRAM FOR MALIGNANT NEOPLASM OF BREAST: ICD-10-CM

## 2022-11-21 PROCEDURE — 77067 SCR MAMMO BI INCL CAD: CPT | Performed by: FAMILY MEDICINE

## 2022-11-21 PROCEDURE — 77063 BREAST TOMOSYNTHESIS BI: CPT | Performed by: FAMILY MEDICINE

## 2022-11-22 ENCOUNTER — TELEPHONE (OUTPATIENT)
Dept: FAMILY MEDICINE CLINIC | Facility: CLINIC | Age: 62
End: 2022-11-22

## 2023-02-27 RX ORDER — ATORVASTATIN CALCIUM 20 MG/1
TABLET, FILM COATED ORAL
Qty: 36 TABLET | Refills: 0 | Status: SHIPPED | OUTPATIENT
Start: 2023-02-27

## 2023-02-27 NOTE — TELEPHONE ENCOUNTER
Requesting refill on atorvastatin. LOV 6/22/2022. No appointment scheduled at this time. LF 9/12/2022. Please approve or deny pending rx. PSR: Please assist patient in scheduling OV. Due for DM recheck.

## 2023-03-07 RX ORDER — OMEPRAZOLE 40 MG/1
40 CAPSULE, DELAYED RELEASE ORAL DAILY
Qty: 90 CAPSULE | Refills: 1 | Status: SHIPPED | OUTPATIENT
Start: 2023-03-07

## 2023-03-14 ENCOUNTER — LAB ENCOUNTER (OUTPATIENT)
Dept: LAB | Age: 63
End: 2023-03-14
Attending: FAMILY MEDICINE
Payer: COMMERCIAL

## 2023-03-14 DIAGNOSIS — Z00.00 ROUTINE GENERAL MEDICAL EXAMINATION AT A HEALTH CARE FACILITY: ICD-10-CM

## 2023-03-14 LAB
EST. AVERAGE GLUCOSE BLD GHB EST-MCNC: 148 MG/DL (ref 68–126)
HBA1C MFR BLD: 6.8 % (ref ?–5.7)

## 2023-03-14 PROCEDURE — 3044F HG A1C LEVEL LT 7.0%: CPT | Performed by: FAMILY MEDICINE

## 2023-03-14 PROCEDURE — 36415 COLL VENOUS BLD VENIPUNCTURE: CPT

## 2023-03-14 PROCEDURE — 83036 HEMOGLOBIN GLYCOSYLATED A1C: CPT

## 2023-04-22 ENCOUNTER — HOSPITAL ENCOUNTER (OUTPATIENT)
Dept: CT IMAGING | Age: 63
Discharge: HOME | End: 2023-04-22
Attending: FAMILY MEDICINE

## 2023-04-22 DIAGNOSIS — Z13.6 SCREENING FOR HEART DISEASE: ICD-10-CM

## 2023-04-27 ENCOUNTER — TELEPHONE (OUTPATIENT)
Dept: FAMILY MEDICINE CLINIC | Facility: CLINIC | Age: 63
End: 2023-04-27

## 2023-04-27 DIAGNOSIS — R93.1 ABNORMAL CT SCAN OF HEART: ICD-10-CM

## 2023-04-27 DIAGNOSIS — I05.9 MITRAL VALVE DISEASE: Primary | ICD-10-CM

## 2023-04-27 NOTE — TELEPHONE ENCOUNTER
----- Message from Ayaka Smith MD sent at 4/27/2023  9:08 AM CDT -----  Some numbers are positive, specially positive for LAD, ok to see    Dr. Papo Tirado M.D. Cardiologist   Dr. [place] office every Thursday. [address] [place] [address], [address] [facility]    Ph: [phone]

## 2023-05-19 RX ORDER — ATORVASTATIN CALCIUM 20 MG/1
TABLET, FILM COATED ORAL
Qty: 36 TABLET | Refills: 0 | Status: SHIPPED | OUTPATIENT
Start: 2023-05-19

## 2023-06-02 RX ORDER — ESCITALOPRAM OXALATE 5 MG/1
5 TABLET ORAL DAILY
Qty: 90 TABLET | Refills: 3 | Status: SHIPPED | OUTPATIENT
Start: 2023-06-02

## 2023-06-05 RX ORDER — ATORVASTATIN CALCIUM 20 MG/1
TABLET, FILM COATED ORAL
Qty: 36 TABLET | Refills: 0 | Status: SHIPPED | OUTPATIENT
Start: 2023-06-05

## 2023-07-13 DIAGNOSIS — E11.9 DIABETES MELLITUS WITHOUT COMPLICATION (HCC): ICD-10-CM

## 2023-07-14 RX ORDER — BLOOD SUGAR DIAGNOSTIC
STRIP MISCELLANEOUS
Qty: 100 STRIP | Refills: 3 | Status: SHIPPED | OUTPATIENT
Start: 2023-07-14

## 2023-07-14 NOTE — TELEPHONE ENCOUNTER
Medication(s) to Refill:   Requested Prescriptions     Pending Prescriptions Disp Refills    ONETOUCH ULTRA In Vitro Strip [facility] Med Name: ONE TOUCH ULTRA BLUE TEST STRP] 100 strip 3     Sig: USE TO TEST BLOOD GLUCOSE ONCE DAILY         Reason for Medication Refill being sent to Provider / Reason Protocol Failed:  [] 90 day refill has already been granted  [] Blood Pressure out of range  [] Labs Abnormal/over due  [] Medication not previously prescribed by Provider  [] Non-Protocol Medication  [] Controlled Substance   [x] Due for appointment- no future appointment scheduled  [] No Follow up specified      Last Time Medication was Filled:  11/1/2021      Last Office Visit with PCP: 6/22/2022  When Patient was Due Back to the Office:  6 months   (from when PCP last addressed condition)    Future Appointments:  No future appointments.       Last Blood Pressures:  BP Readings from Last 2 Encounters:  06/27/22 : 159/89  06/22/22 : 122/82      Action taken:  [] Refill approved per protocol  [x] Routing to provider for approval

## 2023-08-04 ENCOUNTER — MED REC SCAN ONLY (OUTPATIENT)
Dept: FAMILY MEDICINE CLINIC | Facility: CLINIC | Age: 63
End: 2023-08-04

## 2023-08-28 RX ORDER — OMEPRAZOLE 40 MG/1
40 CAPSULE, DELAYED RELEASE ORAL DAILY
Qty: 90 CAPSULE | Refills: 1 | Status: SHIPPED | OUTPATIENT
Start: 2023-08-28

## 2023-08-28 NOTE — TELEPHONE ENCOUNTER
Medication(s) to Refill:   Requested Prescriptions     Pending Prescriptions Disp Refills    OMEPRAZOLE 40 MG Oral Capsule Delayed Release [Pharmacy Med Name: OMEPRAZOLE DR 40 MG CAPSULE] 90 capsule 1     Sig: TAKE 1 CAPSULE (40 MG TOTAL) BY MOUTH IN THE MORNING         Reason for Medication Refill being sent to Provider / Reason Protocol Failed:  [] 90 day refill has already been granted  [] Blood Pressure out of range  [] Labs Abnormal/over due  [] Medication not previously prescribed by Provider  [] Non-Protocol Medication  [] Controlled Substance   [] Due for appointment- no future appointment scheduled  [] No Follow up specified      Last Time Medication was Filled:  3/7/23      Last Office Visit with PCP: 6/22/22    When Patient was Due Back to the Office:  6 months  (from when PCP last addressed condition)    Future Appointments:  Future Appointments   Date Time Provider Karon Coy   8/30/2023  8:40 AM Claribel Rizzo MD [facility] [place]         Last Blood Pressures:  BP Readings from Last 2 Encounters:  06/27/22 : 159/89  06/22/22 : 122/82        Action taken:  [] Refill approved per protocol  [] Routing to provider for approval

## 2023-08-30 ENCOUNTER — LAB ENCOUNTER (OUTPATIENT)
Dept: LAB | Age: 63
End: 2023-08-30
Attending: FAMILY MEDICINE
Payer: COMMERCIAL

## 2023-08-30 ENCOUNTER — OFFICE VISIT (OUTPATIENT)
Dept: FAMILY MEDICINE CLINIC | Facility: CLINIC | Age: 63
End: 2023-08-30
Payer: COMMERCIAL

## 2023-08-30 ENCOUNTER — PATIENT MESSAGE (OUTPATIENT)
Dept: FAMILY MEDICINE CLINIC | Facility: CLINIC | Age: 63
End: 2023-08-30

## 2023-08-30 VITALS
OXYGEN SATURATION: 98 % | DIASTOLIC BLOOD PRESSURE: 78 MMHG | HEART RATE: 78 BPM | HEIGHT: 61 IN | BODY MASS INDEX: 29.36 KG/M2 | SYSTOLIC BLOOD PRESSURE: 118 MMHG | RESPIRATION RATE: 16 BRPM | WEIGHT: 155.5 LBS

## 2023-08-30 DIAGNOSIS — Z12.31 VISIT FOR SCREENING MAMMOGRAM: ICD-10-CM

## 2023-08-30 DIAGNOSIS — Z13.29 SCREENING FOR ENDOCRINE, NUTRITIONAL, METABOLIC AND IMMUNITY DISORDER: ICD-10-CM

## 2023-08-30 DIAGNOSIS — E11.9 TYPE 2 DIABETES MELLITUS WITHOUT COMPLICATION, WITHOUT LONG-TERM CURRENT USE OF INSULIN (HCC): ICD-10-CM

## 2023-08-30 DIAGNOSIS — Z13.0 SCREENING FOR ENDOCRINE, NUTRITIONAL, METABOLIC AND IMMUNITY DISORDER: ICD-10-CM

## 2023-08-30 DIAGNOSIS — Z00.00 ROUTINE GENERAL MEDICAL EXAMINATION AT A HEALTH CARE FACILITY: Primary | ICD-10-CM

## 2023-08-30 DIAGNOSIS — Z13.228 SCREENING FOR ENDOCRINE, NUTRITIONAL, METABOLIC AND IMMUNITY DISORDER: ICD-10-CM

## 2023-08-30 DIAGNOSIS — Z13.21 SCREENING FOR ENDOCRINE, NUTRITIONAL, METABOLIC AND IMMUNITY DISORDER: ICD-10-CM

## 2023-08-30 LAB
ALBUMIN SERPL-MCNC: 3.5 G/DL (ref 3.4–5)
ALBUMIN/GLOB SERPL: 1 {RATIO} (ref 1–2)
ALP LIVER SERPL-CCNC: 74 U/L
ALT SERPL-CCNC: 24 U/L
ANION GAP SERPL CALC-SCNC: 4 MMOL/L (ref 0–18)
AST SERPL-CCNC: 12 U/L (ref 15–37)
BASOPHILS # BLD AUTO: 0.03 X10(3) UL (ref 0–0.2)
BASOPHILS NFR BLD AUTO: 0.6 %
BILIRUB SERPL-MCNC: 0.4 MG/DL (ref 0.1–2)
BUN BLD-MCNC: 12 MG/DL (ref 7–18)
CALCIUM BLD-MCNC: 8.7 MG/DL (ref 8.5–10.1)
CHLORIDE SERPL-SCNC: 107 MMOL/L (ref 98–112)
CHOLEST SERPL-MCNC: 170 MG/DL (ref ?–200)
CO2 SERPL-SCNC: 29 MMOL/L (ref 21–32)
CREAT BLD-MCNC: 0.72 MG/DL
CREAT UR-SCNC: 51.2 MG/DL
EGFRCR SERPLBLD CKD-EPI 2021: 93 ML/MIN/1.73M2 (ref 60–?)
EOSINOPHIL # BLD AUTO: 0.16 X10(3) UL (ref 0–0.7)
EOSINOPHIL NFR BLD AUTO: 3.5 %
ERYTHROCYTE [DISTWIDTH] IN BLOOD BY AUTOMATED COUNT: 13.7 %
EST. AVERAGE GLUCOSE BLD GHB EST-MCNC: 137 MG/DL (ref 68–126)
FASTING PATIENT LIPID ANSWER: YES
FASTING STATUS PATIENT QL REPORTED: YES
GLOBULIN PLAS-MCNC: 3.5 G/DL (ref 2.8–4.4)
GLUCOSE BLD-MCNC: 115 MG/DL (ref 70–99)
HBA1C MFR BLD: 6.4 % (ref ?–5.7)
HCT VFR BLD AUTO: 38.7 %
HDLC SERPL-MCNC: 45 MG/DL (ref 40–59)
HGB BLD-MCNC: 11.9 G/DL
IMM GRANULOCYTES # BLD AUTO: 0.02 X10(3) UL (ref 0–1)
IMM GRANULOCYTES NFR BLD: 0.4 %
LDLC SERPL CALC-MCNC: 107 MG/DL (ref ?–100)
LYMPHOCYTES # BLD AUTO: 1.09 X10(3) UL (ref 1–4)
LYMPHOCYTES NFR BLD AUTO: 23.6 %
MCH RBC QN AUTO: 29.7 PG (ref 26–34)
MCHC RBC AUTO-ENTMCNC: 30.7 G/DL (ref 31–37)
MCV RBC AUTO: 96.5 FL
MICROALBUMIN UR-MCNC: <0.5 MG/DL
MONOCYTES # BLD AUTO: 0.47 X10(3) UL (ref 0.1–1)
MONOCYTES NFR BLD AUTO: 10.2 %
NEUTROPHILS # BLD AUTO: 2.85 X10 (3) UL (ref 1.5–7.7)
NEUTROPHILS # BLD AUTO: 2.85 X10(3) UL (ref 1.5–7.7)
NEUTROPHILS NFR BLD AUTO: 61.7 %
NONHDLC SERPL-MCNC: 125 MG/DL (ref ?–130)
OSMOLALITY SERPL CALC.SUM OF ELEC: 291 MOSM/KG (ref 275–295)
PLATELET # BLD AUTO: 321 10(3)UL (ref 150–450)
POTASSIUM SERPL-SCNC: 4 MMOL/L (ref 3.5–5.1)
PROT SERPL-MCNC: 7 G/DL (ref 6.4–8.2)
RBC # BLD AUTO: 4.01 X10(6)UL
SODIUM SERPL-SCNC: 140 MMOL/L (ref 136–145)
TRIGL SERPL-MCNC: 95 MG/DL (ref 30–149)
TSI SER-ACNC: 0.62 MIU/ML (ref 0.36–3.74)
VIT B12 SERPL-MCNC: 400 PG/ML (ref 193–986)
VIT D+METAB SERPL-MCNC: 98 NG/ML (ref 30–100)
VLDLC SERPL CALC-MCNC: 16 MG/DL (ref 0–30)
WBC # BLD AUTO: 4.6 X10(3) UL (ref 4–11)

## 2023-08-30 PROCEDURE — 82570 ASSAY OF URINE CREATININE: CPT

## 2023-08-30 PROCEDURE — 80061 LIPID PANEL: CPT

## 2023-08-30 PROCEDURE — 84443 ASSAY THYROID STIM HORMONE: CPT

## 2023-08-30 PROCEDURE — 99396 PREV VISIT EST AGE 40-64: CPT | Performed by: FAMILY MEDICINE

## 2023-08-30 PROCEDURE — 36415 COLL VENOUS BLD VENIPUNCTURE: CPT

## 2023-08-30 PROCEDURE — 82607 VITAMIN B-12: CPT

## 2023-08-30 PROCEDURE — 83036 HEMOGLOBIN GLYCOSYLATED A1C: CPT

## 2023-08-30 PROCEDURE — 80053 COMPREHEN METABOLIC PANEL: CPT

## 2023-08-30 PROCEDURE — 82306 VITAMIN D 25 HYDROXY: CPT

## 2023-08-30 PROCEDURE — 3008F BODY MASS INDEX DOCD: CPT | Performed by: FAMILY MEDICINE

## 2023-08-30 PROCEDURE — 85025 COMPLETE CBC W/AUTO DIFF WBC: CPT

## 2023-08-30 PROCEDURE — 3074F SYST BP LT 130 MM HG: CPT | Performed by: FAMILY MEDICINE

## 2023-08-30 PROCEDURE — 82043 UR ALBUMIN QUANTITATIVE: CPT

## 2023-08-30 PROCEDURE — 3078F DIAST BP <80 MM HG: CPT | Performed by: FAMILY MEDICINE

## 2023-10-02 RX ORDER — ATORVASTATIN CALCIUM 20 MG/1
20 TABLET, FILM COATED ORAL
Qty: 36 TABLET | Refills: 0 | Status: SHIPPED | OUTPATIENT
Start: 2023-10-02

## 2023-10-02 NOTE — TELEPHONE ENCOUNTER
Pt req Atorvastatin Refill 20mg 1tab three times a week. Please advise of dosage / frequency. Also requesting Rx for  [facility]. Blood sugars avg under 100-115. Has noted weight loss.   LOV 08/30/23

## 2023-10-31 ENCOUNTER — MED REC SCAN ONLY (OUTPATIENT)
Dept: FAMILY MEDICINE CLINIC | Facility: CLINIC | Age: 63
End: 2023-10-31

## 2023-11-01 ENCOUNTER — HOSPITAL ENCOUNTER (OUTPATIENT)
Dept: GENERAL RADIOLOGY | Age: 63
Discharge: HOME | End: 2023-11-01
Attending: PODIATRIST
Payer: COMMERCIAL

## 2023-11-01 ENCOUNTER — OFFICE VISIT (OUTPATIENT)
Facility: LOCATION | Age: 63
End: 2023-11-01

## 2023-11-01 DIAGNOSIS — M25.375 FOOT JOINT INSTABILITY, LEFT: ICD-10-CM

## 2023-11-01 DIAGNOSIS — M25.572 LEFT ANKLE PAIN, UNSPECIFIED CHRONICITY: ICD-10-CM

## 2023-11-01 DIAGNOSIS — B07.0 PLANTAR VERRUCA: ICD-10-CM

## 2023-11-01 DIAGNOSIS — M76.72 PERONEAL TENDINITIS, LEFT: Primary | ICD-10-CM

## 2023-11-01 DIAGNOSIS — M62.462 GASTROCNEMIUS EQUINUS OF LEFT LOWER EXTREMITY: ICD-10-CM

## 2023-11-01 DIAGNOSIS — M62.461 GASTROCNEMIUS EQUINUS OF RIGHT LOWER EXTREMITY: ICD-10-CM

## 2023-11-01 DIAGNOSIS — M25.374 FOOT JOINT INSTABILITY, RIGHT: ICD-10-CM

## 2023-11-01 PROCEDURE — 73610 X-RAY EXAM OF ANKLE: CPT | Performed by: PODIATRIST

## 2023-11-01 PROCEDURE — 99214 OFFICE O/P EST MOD 30 MIN: CPT | Performed by: PODIATRIST

## 2023-11-01 NOTE — PROGRESS NOTES
[facility]  Progress Note    Oliverio Crisostomo is a 58year old female. Patient presents with:  Warts: Patient has warts on the bottom of right foot. Previous patient of Dr Julián Le. She denies any numbness, tingling, does get burning sensations, 2/10 for pain. Ankle Pain: Left lateral ankle pain. Patient states that it comes at night and she is unable to sleep. She rates pain 9/10/23, she gets a foot and leg cramp. Denies any numbness. HPI:     Patient is a pleasant 16-year-old female who is presenting to clinic today with a few complaints. Patient states that she believes she has a wart on the bottom of her right foot. Rates the pain 2/10, particularly when weightbearing. She does feel a burning sensation as well. Patient also states that she has pain along the outside of her left ankle. Most of the pain comes at night and it feels like a foot and leg cramp. Rates the pain 9/10 and states that stops her from sleeping on occasion. Denying recent injury or other complaints at this time is here today for further evaluation and care. Denies recent nausea, vomiting, fever, chills. Allergies: Msg [Monosodium Glutamate] and Bactrim [Sulfamethoxazole W/Trimethoprim]   Current Outpatient Medications   Medication Sig Dispense Refill    Tirzepatide (MOUNJARO) 5 MG/0.5ML Subcutaneous Solution Pen-injector Inject 0.5 mL into the skin once a week. 2 mL 3    atorvastatin 20 MG Oral Tab Take 1 tablet (20 mg total) by mouth 3 (three) times a week. 36 tablet 0    metFORMIN HCl 1000 MG Oral Tab TAKE 1 TABLET BY MOUTH TWICE A DAY WITH MEALS 180 tablet 1    Omeprazole 40 MG Oral Capsule Delayed Release Take 1 capsule (40 mg total) by mouth daily. 90 capsule 1    Glucose Blood (ONETOUCH ULTRA) In Vitro Strip  strip 3    ESCITALOPRAM 5 MG Oral Tab TAKE 1 TABLET (5 MG TOTAL) BY MOUTH DAILY. 90 tablet 3    Turmeric (QC TUMERIC COMPLEX OR) Take by mouth.       ascorbic acid 500 MG Oral Tab Take 1 tablet (500 mg total) by mouth daily. ONETOUCH DELICA LANCETS 13S Does not apply Misc Use to test blood glucose once daily 100 each 3    acetaminophen 500 MG Oral Tab Take 2 tablets (1,000 mg total) by mouth one time. Blood Glucose Monitoring Suppl (ONETOUCH ULTRA 2) w/Device Does not apply Kit Use to test blood glucose once daily 1 kit 0    VITAMIN E daily      MULTIVITAMINS OR TABS 1 TABLET DAILY      CALCIUM 1200 8581-3118 MG-UNIT OR CHEW 1 TABLET TID      FISH OIL 1000 MG OR CAPS 1 TABLETS TID      ASPIRIN 81 MG OR TABS 1 TABLET DAILY      SLOW FE OR 1 DAILY occasional        Past Medical History:   Diagnosis Date    Abdominal distention     ACTINIC KERATOSIS/dysplastic nevi 10/8/2008    Dr. Emilia Medeiros follows.     Arthritis Years ago    Osteoarthritis    Belching     Body piercing     Ears    Decorative tattoo     R wrist    Depression     Diabetes mellitus ([address] 2017    Gestational diabetes too,     Esophageal reflux     Flatulence/gas pain/belching     GLUCOSE INTOLERANCE 10/6/2008    HISTORY OF GESTATIONAL DIABETES    Hashimoto's thyroiditis 2010    Headache disorder     Very occasionally    Hemorrhoids     High cholesterol     History of cardiac murmur 2005    History of depression 2009    Daughter  in a car accident    Internal hemorrhoids without mention of complication     MITRAL VALVE INSUFFICIENCY 10/8/2008    OSTEOPENIA 10/6/2008    Pain in joints Knees, L foot    Postmenopausal atrophic vaginitis 10/8/2008    Problems with swallowing several months ago    Intermittently feels like food is stuck, R side    Type II or unspecified type diabetes mellitus without mention of complication, not stated as uncontrolled     Unspecified disorder of thyroid     nodule to be biopsied 5/1/15    Valvular disease     Visual impairment     glasses    Wears glasses     Glasses since [facility]      Past Surgical History:   Procedure Laterality Date    CALCIUM SCORE, CARDIO (DMG)  2/25/10 Calcium score was 4    COLONOSCOPY  5/06; 10/11    internal hemorrhoids    COLPOSCOPY, CERVIX W/UPPER ADJACENT VAGINA; W/BIOPSY(S), CERVIX      at age 21    CT ASPIRATION THYROID, GUIDE INCL, FIRST LESION(CPT=10009)      CYST ASPIRATION LEFT  2003    DILATE ESOPHAGUS  10/11    Dr. Keli Melissa  [age] years old    left inguinal  repair    HERNIA SURGERY  5/94    umbilical hernia repair    HYSTERECTOMY  6/05    MEENU-BSO    NICOLAS LOCALIZATION WIRE 1 SITE LEFT (CPT=19281)      AGE 22 LIPOMA    NICOLAS LOCALIZATION WIRE 1 SITE LEFT (CPT=19281)  02/2001    ADH    NEEDLE BIOPSY LEFT  2012    ADH    OOPHORECTOMY  2005    OTHER      lipoma removed age 20    OTHER  8/05    rectocele, repair of prolapsed vagina    OTHER SURGICAL HISTORY  6/05    bladder suspension    OTHER SURGICAL HISTORY  8/09    removal of cyst - left hand    OTHER SURGICAL HISTORY  1980    removal of fibroidenoma - lft axilla    OTHER SURGICAL HISTORY  2005    tummy tuck    REDUCTION LEFT  07/30/2018    REDUCTION RIGHT  07/30/2018    STRESS ECHO TEST, CARDIO (DMG)  4/6/12    no ischemia    TOTAL ABDOM HYSTERECTOMY  6/05    UPPER GI ENDOSCOPY - REFERRAL  10/6/11    no malignancy, diffuse intramucosal eosinophils      Family History   Problem Relation Age of Onset    Hypertension Father     Diabetes Father         Type II    Stroke Father         X2 or 3, mild    Lipids Father     Thyroid Disorder Mother         hypothyroidism    Gastro-Intestinal Disorder Mother         ischemic colitis    Hypertension Mother     Diabetes Paternal Grandmother         Type II    Diabetes Sister         DM I    Thyroid Disorder Sister         hypothyroidism    Thyroid Disorder Brother         hypothyroidism    Thyroid Disorder Sister         hypothyroid    Lung Disorder Brother         chronic cough    Gastro-Intestinal Disorder Brother 54        celiac disease    Thyroid Disorder Sister         hypothryoidism    Arthritis Sister         Bilateral knee replacements Diabetes Sister         Type II    Breast Cancer Paternal Cousin Female 61        approx age    Breast Cancer Paternal Cousin Female 54    Breast Cancer Paternal Aunt         dx age 63's      Social History    Socioeconomic History      Marital status:       Number of children: 2    Occupational History      Occupation: x-ray technician        Employer: [employer]    Tobacco Use      Smoking status: Never      Smokeless tobacco: Never    Vaping Use      Vaping Use: Never used    Substance and Sexual Activity      Alcohol use: Yes        Alcohol/week: 2.0 standard drinks of alcohol        Types: 2 Standard drinks or equivalent per week        Comment: 2x month      Drug use: No      Sexual activity: Never        Partners: Male        Birth control/protection: Hysterectomy    Other Topics      Concerns:        Caffeine Concern: Yes          diet coke 1/day        Exercise: No        Seat Belt: Yes          REVIEW OF SYSTEMS:     10 point ROS completed and was negative, except for pertinent positive and negatives stated in subjective. EXAM:     GENERAL: well developed, well nourished, in no apparent distress  EXTREMITIES:   1. Integument: Skin appears moist, warm, and supple with positive hair growth. There are no color changes. No open lesions. No macerations. Hyperkeratotic lesion x2 plantar aspect of right forefoot with pinpoint subdermal hemorrhage. Upon debridement, there is a break in skin lines, consistent with plantar verruca. 2. Vascular: Dorsalis pedis 2/4 bilateral and posterior tibial pulses 2/4 bilateral, capillary refill normal.  3. Neurological: Gross sensation intact via light touch bilaterally. Normal sharp/dull sensation   4. Musculoskeletal: All muscle groups are graded 5/5 in the foot and ankle with pain with inversion against resistance to the left lower extremity.   Patient does have pain with palpation the course of the peroneal tendons starting proximally in the lateral mid lower extremity with pain coursing distally to the inframalleolar space. Patient does have midfoot instability bilaterally. Decreased medial longitudinal arch noted upon weightbearing. Patient does have decreased ankle joint dorsiflexion with the knee extended, which does improve with the knee flexed consistent with bilateral equinus deformity. No pain within the gutters of the left ankle. XR ANKLE WEIGHTBEARING (3 VIEWS), LEFT (CPT=73610)    Result Date: 11/1/2023  PROCEDURE:  XR ANKLE WEIGHTBEARING (3 VIEWS), LEFT (CPT=73610)  TECHNIQUE:  Three views were obtained. COMPARISON:  None. INDICATIONS:  M25.572 Left ankle pain, unspecified chronicity  PATIENT STATED HISTORY: (As transcribed by Technologist)  Lateral ankle pain and spasming, worse at night, causing ankle to hyperflex. No injury. Findings:  No fracture or dislocation. Midfoot osteoarthritic changes are noted. Bimalleolar osteophytes are noted. IMPRESSION:  Moderate osteoarthritic changes of the left ankle. [facility]   Dictated by (CST): Vicci Dance, MD on 11/01/2023 at 4:21 PM     Finalized by (CST): Vicci Dance, MD on 11/01/2023 at 4:21 PM          ASSESSMENT AND PLAN:   Diagnoses and all orders for this visit:    Peroneal tendinitis, left    Foot joint instability, left    Foot joint instability, right    Gastrocnemius equinus of right lower extremity    Gastrocnemius equinus of left lower extremity    Left ankle pain, unspecified chronicity  -     XR ANKLE WEIGHTBEARING (3 VIEWS), LEFT (CPT=73610); Future    Plantar verruca        Plan:   -Patient was seen and evaluated today in clinic.    -Patient seen and evaluated today in clinic. All relevant clinic notes and imaging were reviewed prior to visit. Left ankle x-rays were obtained today and independently reviewed, showing no acute osseous abnormalities. Osteoarthritic changes are noted within the left ankle joint.   Discussed findings, which are consistent with peroneal tendinitis of the left lower extremity. Discussed conservative management and potential for formal PT. Discussed possible oral steroids if patient is not diabetic, otherwise use of NSAIDS if no history of GERD or stomach upset. Recommend cryotherapy/icing. Discussed the importance of rest and the need for immobilization. Recommend use of OTC and/or custom orthotics in the treatment and future prevention of tendinitis. Supportive shoe gear recommendation was also given to patient today. Custom orthotics are medically necessary to support and off-load the forces leading to the pathology and future prevention of further tendon tear and deformity. Recommend use of compression stockings vs. ACE wrap to control edema/swelling. Discussed potential need to order MRI if conservative management fails to resolve symptoms out of concern for underlying tear. Discussed potential need for surgical intervention if conservative management fails to resolve symptoms. Provided patient with at home stretching and strengthening program.    Patient would like to run benefits to get preauthorization for custom orthotics. We will follow-up in the near future to discuss casting.    -Debrided hyperkeratotic lesion to plantar right foot, which did have clinical characteristics of a plantar verruca. -Provided patient with Jacquiline Weston today. She will change it every day and utilize duct tape for an occlusive dressing.    -The patient indicates understanding of these issues and agrees to the plan. Time spent reviewing pertinent information from patient's chart, reviewing any pertinent imaging, obtaining history and physical exam, and discussing and mutually agreeing on a treatment plan: 35 minutes    RTC 1-2 weeks for custom orthotic casting      Yesika Melendez DPM        11/1/2023    Dragon speech recognition software was used to prepare this note. Errors in word recognition may occur.   Please contact me with any questions/concerns with this note.

## 2023-11-09 ENCOUNTER — HOSPITAL ENCOUNTER (OUTPATIENT)
Dept: MAMMOGRAPHY | Age: 63
Discharge: HOME | End: 2023-11-09
Attending: FAMILY MEDICINE
Payer: COMMERCIAL

## 2023-11-09 DIAGNOSIS — Z12.31 VISIT FOR SCREENING MAMMOGRAM: ICD-10-CM

## 2023-11-09 PROCEDURE — 77063 BREAST TOMOSYNTHESIS BI: CPT | Performed by: FAMILY MEDICINE

## 2023-11-09 PROCEDURE — 77067 SCR MAMMO BI INCL CAD: CPT | Performed by: FAMILY MEDICINE

## 2023-11-13 ENCOUNTER — TELEPHONE (OUTPATIENT)
Dept: FAMILY MEDICINE CLINIC | Facility: CLINIC | Age: 63
End: 2023-11-13

## 2023-11-15 ENCOUNTER — OFFICE VISIT (OUTPATIENT)
Facility: LOCATION | Age: 63
End: 2023-11-15

## 2023-11-15 DIAGNOSIS — M25.375 FOOT JOINT INSTABILITY, LEFT: ICD-10-CM

## 2023-11-15 DIAGNOSIS — M25.572 LEFT ANKLE PAIN, UNSPECIFIED CHRONICITY: ICD-10-CM

## 2023-11-15 DIAGNOSIS — M76.72 PERONEAL TENDINITIS, LEFT: Primary | ICD-10-CM

## 2023-11-15 DIAGNOSIS — M62.462 GASTROCNEMIUS EQUINUS OF LEFT LOWER EXTREMITY: ICD-10-CM

## 2023-11-15 DIAGNOSIS — M25.374 FOOT JOINT INSTABILITY, RIGHT: ICD-10-CM

## 2023-11-15 DIAGNOSIS — M62.461 GASTROCNEMIUS EQUINUS OF RIGHT LOWER EXTREMITY: ICD-10-CM

## 2023-11-15 NOTE — PROGRESS NOTES
[id number]  Progress Note    Gela Granados is a 58year old female. Chief Complaint   Patient presents with    Orthotic Status     Patient is here to be casted for custom orthotics. She called and spoke with insurance and agrees to move forward. HPI:     Patient is a pleasant 60-year-old female who is returning to clinic today to be casted for custom orthotics and for recheck on left the peroneal tendinitis. Patient states that she is doing pretty well overall. Pain is improved and she is hoping to be casted for custom orthotics today. Patient did get preauthorization from her insurance company. She is denying any recent injuries or new complaints today. She is here today for custom orthotic casting. Denies recent nausea, vomiting, fever, chills. Allergies: Msg [monosodium glutamate] and Bactrim [sulfamethoxazole w/trimethoprim]   Current Outpatient Medications   Medication Sig Dispense Refill    Tirzepatide (MOUNJARO) 5 MG/0.5ML Subcutaneous Solution Pen-injector Inject 0.5 mL into the skin once a week. 2 mL 3    atorvastatin 20 MG Oral Tab Take 1 tablet (20 mg total) by mouth 3 (three) times a week. 36 tablet 0    metFORMIN HCl 1000 MG Oral Tab TAKE 1 TABLET BY MOUTH TWICE A DAY WITH MEALS 180 tablet 1    Omeprazole 40 MG Oral Capsule Delayed Release Take 1 capsule (40 mg total) by mouth daily. 90 capsule 1    Glucose Blood (ONETOUCH ULTRA) In Vitro Strip  strip 3    ESCITALOPRAM 5 MG Oral Tab TAKE 1 TABLET (5 MG TOTAL) BY MOUTH DAILY. 90 tablet 3    Turmeric (QC TUMERIC COMPLEX OR) Take by mouth. ascorbic acid 500 MG Oral Tab Take 1 tablet (500 mg total) by mouth daily. ONETOUCH DELICA LANCETS 73K Does not apply Misc Use to test blood glucose once daily 100 each 3    acetaminophen 500 MG Oral Tab Take 2 tablets (1,000 mg total) by mouth one time.       Blood Glucose Monitoring Suppl (ONETOUCH ULTRA 2) w/Device Does not apply Kit Use to test blood glucose once daily 1 kit 0    VITAMIN E daily      MULTIVITAMINS OR TABS 1 TABLET DAILY      CALCIUM 1200 0325-5511 MG-UNIT OR CHEW 1 TABLET TID      FISH OIL 1000 MG OR CAPS 1 TABLETS TID      ASPIRIN 81 MG OR TABS 1 TABLET DAILY      SLOW FE OR 1 DAILY occasional        Past Medical History:   Diagnosis Date    Abdominal distention     ACTINIC KERATOSIS/dysplastic nevi 10/08/2008    Dr. Davy Garcia follows.     Arthritis Years ago    Osteoarthritis    Belching     Body piercing     Ears    Decorative tattoo     R wrist    Depression     Diabetes mellitus ([address] 2017    Gestational diabetes too,     Esophageal reflux     Flatulence/gas pain/belching     GLUCOSE INTOLERANCE 10/06/2008    HISTORY OF GESTATIONAL DIABETES    Hashimoto's thyroiditis 2010    Headache disorder     Very occasionally    Hemorrhoids     High cholesterol     History of cardiac murmur     History of depression 2009    Daughter  in a car accident    Internal hemorrhoids without mention of complication     MITRAL VALVE INSUFFICIENCY 10/08/2008    OSTEOPENIA 10/06/2008    Pain in joints Knees, L foot    Postmenopausal atrophic vaginitis 10/08/2008    Problems with swallowing several months ago    Intermittently feels like food is stuck, R side    Type II or unspecified type diabetes mellitus without mention of complication, not stated as uncontrolled     Type II or unspecified type diabetes mellitus without mention of complication, not stated as uncontrolled 2015    Unspecified disorder of thyroid     nodule to be biopsied 5/1/15    Valvular disease     Visual impairment     glasses    Wears glasses     Glasses since college      Past Surgical History:   Procedure Laterality Date    CALCIUM SCORE, CARDIO (DMG)  2/25/10    Calcium score was 4    COLONOSCOPY  ; 10/11    internal hemorrhoids    COLPOSCOPY, CERVIX W/UPPER ADJACENT VAGINA; W/BIOPSY(S), CERVIX      at age 21    CT ASPIRATION THYROID, GUIDE INCL, FIRST LESION(CPT=10009)      CYST ASPIRATION LEFT  2003    DILATE ESOPHAGUS  10/11    Dr. Rosaline Gracia  [age] years old    left inguinal  repair    HERNIA SURGERY  4/27    umbilical hernia repair    HYSTERECTOMY  6/05    MEENU-BSO    NICOLAS LOCALIZATION WIRE 1 SITE LEFT (CPT=19281)      AGE 22 LIPOMA    NICOLAS LOCALIZATION WIRE 1 SITE LEFT (CPT=19281)  02/2001    ADH    NEEDLE BIOPSY LEFT  2012    ADH    OOPHORECTOMY  2005    OTHER      lipoma removed age 20    OTHER  8/05    rectocele, repair of prolapsed vagina    OTHER SURGICAL HISTORY  6/05    bladder suspension    OTHER SURGICAL HISTORY  8/09    removal of cyst - left hand    OTHER SURGICAL HISTORY  1980    removal of fibroidenoma - lft axilla    OTHER SURGICAL HISTORY  2005    tummy tuck    REDUCTION LEFT  07/30/2018    REDUCTION RIGHT  07/30/2018    STRESS ECHO TEST, CARDIO (DMG)  4/6/12    no ischemia    TOTAL ABDOM HYSTERECTOMY  6/05    UPPER GI ENDOSCOPY - REFERRAL  10/6/11    no malignancy, diffuse intramucosal eosinophils      Family History   Problem Relation Age of Onset    Hypertension Father     Diabetes Father         Type II    Stroke Father         X2 or 3, mild    Lipids Father     Thyroid Disorder Mother         hypothyroidism    Gastro-Intestinal Disorder Mother         ischemic colitis    Hypertension Mother     Diabetes Paternal Grandmother         Type II    Diabetes Sister         DM I    Thyroid Disorder Sister         hypothyroidism    Thyroid Disorder Brother         hypothyroidism    Thyroid Disorder Sister         hypothyroid    Lung Disorder Brother         chronic cough    Gastro-Intestinal Disorder Brother 54        celiac disease    Thyroid Disorder Sister         hypothryoidism    Arthritis Sister         Bilateral knee replacements    Diabetes Sister         Type II    Breast Cancer Paternal Cousin Female 61        approx age    Breast Cancer Paternal Cousin Female 54    Breast Cancer Paternal Aunt         dx age 63's      Social History     Socioeconomic History    Marital status:     Number of children: 2   Occupational History    Occupation: x-ray technician     Employer: [employer]   Tobacco Use    Smoking status: Never    Smokeless tobacco: Never   Vaping Use    Vaping Use: Never used   Substance and Sexual Activity    Alcohol use: Yes     Alcohol/week: 2.0 standard drinks of alcohol     Types: 2 Standard drinks or equivalent per week     Comment: 2x month    Drug use: No    Sexual activity: Never     Partners: Male     Birth control/protection: Hysterectomy   Other Topics Concern    Caffeine Concern Yes     Comment: diet coke 1/day    Exercise No    Seat Belt Yes           REVIEW OF SYSTEMS:     10 point ROS completed and was negative, except for pertinent positive and negatives stated in subjective. EXAM:   GENERAL: well developed, well nourished, in no apparent distress  EXTREMITIES:              1. Integument: Skin appears moist, warm, and supple with positive hair growth. There are no color changes. No open lesions. No macerations. Hyperkeratotic lesion x2 plantar aspect of right forefoot with pinpoint subdermal hemorrhage. Upon debridement, there is a break in skin lines, consistent with plantar verruca. 2. Vascular: Dorsalis pedis 2/4 bilateral and posterior tibial pulses 2/4 bilateral, capillary refill normal.  3. Neurological: Gross sensation intact via light touch bilaterally. Normal sharp/dull sensation              4. Musculoskeletal: All muscle groups are graded 5/5 in the foot and ankle with mild pain with inversion against resistance to the left lower extremity. Patient does have mild pain with palpation the course of the peroneal tendons starting proximally in the lateral mid lower extremity with pain coursing distally to the inframalleolar space. Patient does have midfoot instability bilaterally. Decreased medial longitudinal arch noted upon weightbearing.   Patient does have decreased ankle joint dorsiflexion with the knee extended, which does improve with the knee flexed consistent with bilateral equinus deformity. No pain within the gutters of the left ankle. XR ANKLE WEIGHTBEARING (3 VIEWS), LEFT (CPT=73610)     Result Date: 11/1/2023  PROCEDURE:  XR ANKLE WEIGHTBEARING (3 VIEWS), LEFT (CPT=73610)  TECHNIQUE:  Three views were obtained. COMPARISON:  None. INDICATIONS:  M25.572 Left ankle pain, unspecified chronicity  PATIENT STATED HISTORY: (As transcribed by Technologist)  Lateral ankle pain and spasming, worse at night, causing ankle to hyperflex. No injury. Findings:  No fracture or dislocation. Midfoot osteoarthritic changes are noted. Bimalleolar osteophytes are noted. IMPRESSION:  Moderate osteoarthritic changes of the left ankle. LOCATION:  [facility]   Dictated by (CST): Patti Arechiga MD on 11/01/2023 at 4:21 PM     Finalized by (CST): Patti Arechiga MD on 11/01/2023 at 4:21 PM          ASSESSMENT AND PLAN:   Diagnoses and all orders for this visit:    Peroneal tendinitis, left    Foot joint instability, left    Foot joint instability, right    Gastrocnemius equinus of right lower extremity    Gastrocnemius equinus of left lower extremity    Left ankle pain, unspecified chronicity        Plan:   -Patient was seen and evaluated today in clinic. She received preauthorization for custom orthotics and is wanting to get casted today. -Biomechanical and range of motion measurements taken on this date. The patient is casted for custom orthotics in a neutral position using electronic scanner. The patient will be notified when orthotics arrive. The patient is to continue use of temporary inserts, ice areas as instructed and continue with anti-inflammatories as needed. Recommend continued at home stretching routine. We will look into formal physical therapy if patient does not improve.   -Rx Custom Orthotic  -Pb Custom Orthotic      -The patient indicates understanding of these issues and agrees to the plan. Time spent reviewing pertinent information from patient's chart, reviewing any pertinent imaging, obtaining history and physical exam, and discussing and mutually agreeing on a treatment plan: 20 minutes    RTC when orthotics are      ADRIANNE Richard [facility]        11/15/2023    Dragon speech recognition software was used to prepare this note. Errors in word recognition may occur. Please contact me with any questions/concerns with this note.

## 2023-11-18 ENCOUNTER — OFFICE VISIT (OUTPATIENT)
Dept: FAMILY MEDICINE CLINIC | Facility: CLINIC | Age: 63
End: 2023-11-18
Payer: COMMERCIAL

## 2023-11-18 DIAGNOSIS — N30.01 ACUTE CYSTITIS WITH HEMATURIA: Primary | ICD-10-CM

## 2023-11-18 DIAGNOSIS — R30.0 DYSURIA: ICD-10-CM

## 2023-11-18 LAB
GLUCOSE (URINE DIPSTICK): NEGATIVE MG/DL
KETONES (URINE DIPSTICK): NEGATIVE MG/DL
MULTISTIX LOT#: ABNORMAL NUMERIC
NITRITE, URINE: NEGATIVE
PH, URINE: 5.5 (ref 4.5–8)
PROTEIN (URINE DIPSTICK): 100 MG/DL
SPECIFIC GRAVITY: 1.03 (ref 1–1.03)
UROBILINOGEN,SEMI-QN: 0.2 MG/DL (ref 0–1.9)

## 2023-11-18 PROCEDURE — 87086 URINE CULTURE/COLONY COUNT: CPT | Performed by: NURSE PRACTITIONER

## 2023-11-18 RX ORDER — NITROFURANTOIN 25; 75 MG/1; MG/1
100 CAPSULE ORAL 2 TIMES DAILY
Qty: 14 CAPSULE | Refills: 0 | Status: SHIPPED | OUTPATIENT
Start: 2023-11-18 | End: 2023-11-25
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
## 2023-12-06 RX ORDER — OMEPRAZOLE 40 MG/1
40 CAPSULE, DELAYED RELEASE ORAL DAILY
Qty: 90 CAPSULE | Refills: 3 | Status: SHIPPED | OUTPATIENT
Start: 2023-12-06

## 2024-01-30 ENCOUNTER — TELEPHONE (OUTPATIENT)
Dept: FAMILY MEDICINE CLINIC | Facility: CLINIC | Age: 64
End: 2024-01-30

## 2024-03-05 ENCOUNTER — LAB ENCOUNTER (OUTPATIENT)
Dept: LAB | Age: 64
End: 2024-03-05
Attending: INTERNAL MEDICINE
Payer: COMMERCIAL

## 2024-03-05 DIAGNOSIS — R06.09 DOE (DYSPNEA ON EXERTION): ICD-10-CM

## 2024-03-05 DIAGNOSIS — D50.9 IRON DEFICIENCY ANEMIA, UNSPECIFIED: ICD-10-CM

## 2024-03-05 DIAGNOSIS — R53.83 FATIGUE: ICD-10-CM

## 2024-03-05 DIAGNOSIS — E11.69 DIABETES MELLITUS TYPE 2 IN OBESE: Primary | ICD-10-CM

## 2024-03-05 DIAGNOSIS — E66.9 DIABETES MELLITUS TYPE 2 IN OBESE: Primary | ICD-10-CM

## 2024-03-05 DIAGNOSIS — E78.5 HYPERLIPIDEMIA: ICD-10-CM

## 2024-03-05 LAB
BASOPHILS # BLD AUTO: 0.03 X10(3) UL (ref 0–0.2)
BASOPHILS NFR BLD AUTO: 0.7 %
EOSINOPHIL # BLD AUTO: 0.13 X10(3) UL (ref 0–0.7)
EOSINOPHIL NFR BLD AUTO: 3.2 %
ERYTHROCYTE [DISTWIDTH] IN BLOOD BY AUTOMATED COUNT: 14.1 %
EST. AVERAGE GLUCOSE BLD GHB EST-MCNC: 120 MG/DL (ref 68–126)
HBA1C MFR BLD: 5.8 % (ref ?–5.7)
HCT VFR BLD AUTO: 36 %
HGB BLD-MCNC: 10.9 G/DL
IMM GRANULOCYTES # BLD AUTO: 0 X10(3) UL (ref 0–1)
IMM GRANULOCYTES NFR BLD: 0 %
LYMPHOCYTES # BLD AUTO: 1.47 X10(3) UL (ref 1–4)
LYMPHOCYTES NFR BLD AUTO: 35.9 %
MCH RBC QN AUTO: 29 PG (ref 26–34)
MCHC RBC AUTO-ENTMCNC: 30.3 G/DL (ref 31–37)
MCV RBC AUTO: 95.7 FL
MONOCYTES # BLD AUTO: 0.3 X10(3) UL (ref 0.1–1)
MONOCYTES NFR BLD AUTO: 7.3 %
NEUTROPHILS # BLD AUTO: 2.16 X10 (3) UL (ref 1.5–7.7)
NEUTROPHILS # BLD AUTO: 2.16 X10(3) UL (ref 1.5–7.7)
NEUTROPHILS NFR BLD AUTO: 52.9 %
PLATELET # BLD AUTO: 382 10(3)UL (ref 150–450)
RBC # BLD AUTO: 3.76 X10(6)UL
WBC # BLD AUTO: 4.1 X10(3) UL (ref 4–11)

## 2024-03-05 PROCEDURE — 80053 COMPREHEN METABOLIC PANEL: CPT

## 2024-03-05 PROCEDURE — 80061 LIPID PANEL: CPT

## 2024-03-05 PROCEDURE — 85025 COMPLETE CBC W/AUTO DIFF WBC: CPT

## 2024-03-05 PROCEDURE — 36415 COLL VENOUS BLD VENIPUNCTURE: CPT

## 2024-03-05 PROCEDURE — 83036 HEMOGLOBIN GLYCOSYLATED A1C: CPT

## 2024-03-06 LAB
ALBUMIN SERPL-MCNC: 3.7 G/DL (ref 3.4–5)
ALBUMIN/GLOB SERPL: 1.2 {RATIO} (ref 1–2)
ALP LIVER SERPL-CCNC: 63 U/L
ALT SERPL-CCNC: 20 U/L
ANION GAP SERPL CALC-SCNC: 2 MMOL/L (ref 0–18)
AST SERPL-CCNC: 15 U/L (ref 15–37)
BILIRUB SERPL-MCNC: 0.2 MG/DL (ref 0.1–2)
BUN BLD-MCNC: 20 MG/DL (ref 9–23)
CALCIUM BLD-MCNC: 8.7 MG/DL (ref 8.5–10.1)
CHLORIDE SERPL-SCNC: 110 MMOL/L (ref 98–112)
CHOLEST SERPL-MCNC: 171 MG/DL (ref ?–200)
CO2 SERPL-SCNC: 28 MMOL/L (ref 21–32)
CREAT BLD-MCNC: 0.73 MG/DL
EGFRCR SERPLBLD CKD-EPI 2021: 92 ML/MIN/1.73M2 (ref 60–?)
FASTING PATIENT LIPID ANSWER: YES
FASTING STATUS PATIENT QL REPORTED: YES
GLOBULIN PLAS-MCNC: 3.1 G/DL (ref 2.8–4.4)
GLUCOSE BLD-MCNC: 88 MG/DL (ref 70–99)
HDLC SERPL-MCNC: 53 MG/DL (ref 40–59)
LDLC SERPL CALC-MCNC: 104 MG/DL (ref ?–100)
NONHDLC SERPL-MCNC: 118 MG/DL (ref ?–130)
OSMOLALITY SERPL CALC.SUM OF ELEC: 292 MOSM/KG (ref 275–295)
POTASSIUM SERPL-SCNC: 4.2 MMOL/L (ref 3.5–5.1)
PROT SERPL-MCNC: 6.8 G/DL (ref 6.4–8.2)
SODIUM SERPL-SCNC: 140 MMOL/L (ref 136–145)
TRIGL SERPL-MCNC: 73 MG/DL (ref 30–149)
VLDLC SERPL CALC-MCNC: 12 MG/DL (ref 0–30)

## 2024-03-07 RX ORDER — ESCITALOPRAM OXALATE 5 MG/1
5 TABLET ORAL DAILY
Qty: 90 TABLET | Refills: 3 | Status: SHIPPED | OUTPATIENT
Start: 2024-03-07

## 2024-03-21 RX ORDER — ATORVASTATIN CALCIUM 20 MG/1
20 TABLET, FILM COATED ORAL
Qty: 36 TABLET | Refills: 0 | Status: SHIPPED | OUTPATIENT
Start: 2024-03-22 | End: 2024-05-03

## 2024-05-03 RX ORDER — ATORVASTATIN CALCIUM 20 MG/1
20 TABLET, FILM COATED ORAL
Qty: 36 TABLET | Refills: 3 | Status: SHIPPED | OUTPATIENT
Start: 2024-05-03

## 2024-05-09 RX ORDER — TIRZEPATIDE 2.5 MG/.5ML
2.5 INJECTION, SOLUTION SUBCUTANEOUS WEEKLY
Qty: 2 ML | Refills: 0 | Status: SHIPPED | OUTPATIENT
Start: 2024-05-09 | End: 2024-05-31

## 2024-05-09 NOTE — TELEPHONE ENCOUNTER
Spoke with pt to advise of PCP recommendation. Pt. Willing to drive to [place] for Rx. Rx sent for Zepbound 2.5mg.    Hui Francis MD  [facility] Clinical Staff; Umm Martines, GERMAN Flores asked me yesterday, where she can find Mounjaro, tell her we can do Zepbound 2.5mg or 10mg.  I am ok if she wants 2.5 or 10mg its available in [facility], just let her know

## 2024-05-10 ENCOUNTER — TELEPHONE (OUTPATIENT)
Dept: FAMILY MEDICINE CLINIC | Facility: CLINIC | Age: 64
End: 2024-05-10

## 2024-05-10 NOTE — TELEPHONE ENCOUNTER
Pharmacy is telling patient that Zepbound requires a PA and that they have sent over a request for PA to be initiated.  Do you know if this has been started and can you give an update?  Please advise.

## 2024-05-13 NOTE — TELEPHONE ENCOUNTER
mathew authorization approved  Payer: [employer] Case ID: [id number]    [phone]    [phone]  Note from payer: CaseId:[id number];Status:Approved;Review Type:Prior Auth;Coverage Start Date:04/10/2024;Coverage End Date:01/05/2025;  Approval Details    Authorized from April 10, 2024 to January 5, 2025  Electronic appeal: Not supported  View History    zepbound

## 2024-06-05 RX ORDER — TIRZEPATIDE 2.5 MG/.5ML
2.5 INJECTION, SOLUTION SUBCUTANEOUS WEEKLY
Qty: 2 ML | Refills: 0 | Status: SHIPPED | OUTPATIENT
Start: 2024-06-05 | End: 2024-06-07 | Stop reason: DRUGHIGH

## 2024-06-07 ENCOUNTER — TELEPHONE (OUTPATIENT)
Dept: FAMILY MEDICINE CLINIC | Facility: CLINIC | Age: 64
End: 2024-06-07

## 2024-06-07 RX ORDER — DOXYCYCLINE HYCLATE 100 MG/1
CAPSULE ORAL
Qty: 6 CAPSULE | Refills: 0 | Status: SHIPPED | OUTPATIENT
Start: 2024-06-07

## 2024-06-07 RX ORDER — PREDNISONE 20 MG/1
TABLET ORAL
Qty: 15 TABLET | Refills: 0 | Status: SHIPPED | OUTPATIENT
Start: 2024-06-07

## 2024-06-07 RX ORDER — CLOBETASOL PROPIONATE 0.5 MG/G
OINTMENT TOPICAL
Qty: 45 G | Refills: 1 | Status: SHIPPED | OUTPATIENT
Start: 2024-06-07

## 2024-06-07 RX ORDER — TIRZEPATIDE 5 MG/.5ML
5 INJECTION, SOLUTION SUBCUTANEOUS WEEKLY
Qty: 2 ML | Refills: 0 | Status: SHIPPED | OUTPATIENT
Start: 2024-06-07 | End: 2024-06-29

## 2024-06-07 NOTE — TELEPHONE ENCOUNTER
Pt has poison Ivy on right wrist & arm, it is itching & OTC antibiotic & Hydrocortisone cream is not helping. She has a few open wounds that redness around wound is getting worse, concern it is getting infected.  Please advise on orders for pt's itching/possible infection.

## 2024-06-07 NOTE — TELEPHONE ENCOUNTER
Pt called requesting next dose of Zepbound be sent to her local [facility], they have 5mg in stock. She tolerated Zepbound 2.5mg Ok per Dr. Francis to send Zepbound 5mg Rx.

## 2024-06-07 NOTE — TELEPHONE ENCOUNTER
Send Prednisone 60mg po every day for 5 days, CLobetasole ointment BID and Doxycycline 100mg two tablets first day then one a day for 5 days

## 2024-07-08 ENCOUNTER — TELEPHONE (OUTPATIENT)
Dept: FAMILY MEDICINE CLINIC | Facility: CLINIC | Age: 64
End: 2024-07-08

## 2024-07-08 RX ORDER — TIRZEPATIDE 5 MG/.5ML
5 INJECTION, SOLUTION SUBCUTANEOUS WEEKLY
Qty: 2 ML | Refills: 1 | Status: SHIPPED | OUTPATIENT
Start: 2024-07-08 | End: 2024-07-30

## 2024-07-08 NOTE — TELEPHONE ENCOUNTER
Pt requesting Mounjaro 5mg weekly. She was taking Zepbound 5mg while Mounjaro out of stock. OK to send Rx?

## 2024-08-29 RX ORDER — TIRZEPATIDE 5 MG/.5ML
5 INJECTION, SOLUTION SUBCUTANEOUS WEEKLY
Refills: 1 | OUTPATIENT
Start: 2024-08-29 | End: 2024-09-20

## 2024-09-16 ENCOUNTER — LAB ENCOUNTER (OUTPATIENT)
Dept: LAB | Age: 64
End: 2024-09-16
Attending: FAMILY MEDICINE
Payer: COMMERCIAL

## 2024-09-16 DIAGNOSIS — E78.2 MIXED HYPERLIPIDEMIA: ICD-10-CM

## 2024-09-16 DIAGNOSIS — E06.3 CHRONIC LYMPHOCYTIC THYROIDITIS: ICD-10-CM

## 2024-09-16 DIAGNOSIS — E11.69 DIABETES MELLITUS ASSOCIATED WITH HORMONAL ETIOLOGY (HCC): Primary | ICD-10-CM

## 2024-09-16 DIAGNOSIS — D50.9 IRON DEFICIENCY ANEMIA, UNSPECIFIED: ICD-10-CM

## 2024-09-16 LAB
ALBUMIN SERPL-MCNC: 4.4 G/DL (ref 3.2–4.8)
ALBUMIN/GLOB SERPL: 1.7 {RATIO} (ref 1–2)
ALP LIVER SERPL-CCNC: 68 U/L
ALT SERPL-CCNC: 14 U/L
ANION GAP SERPL CALC-SCNC: 7 MMOL/L (ref 0–18)
AST SERPL-CCNC: 20 U/L (ref ?–34)
BASOPHILS # BLD AUTO: 0.04 X10(3) UL (ref 0–0.2)
BASOPHILS NFR BLD AUTO: 0.7 %
BILIRUB SERPL-MCNC: 0.4 MG/DL (ref 0.2–1.1)
BUN BLD-MCNC: 20 MG/DL (ref 9–23)
CALCIUM BLD-MCNC: 9.6 MG/DL (ref 8.7–10.4)
CHLORIDE SERPL-SCNC: 107 MMOL/L (ref 98–112)
CHOLEST SERPL-MCNC: 153 MG/DL (ref ?–200)
CO2 SERPL-SCNC: 28 MMOL/L (ref 21–32)
CREAT BLD-MCNC: 0.64 MG/DL
EGFRCR SERPLBLD CKD-EPI 2021: 98 ML/MIN/1.73M2 (ref 60–?)
EOSINOPHIL # BLD AUTO: 0.11 X10(3) UL (ref 0–0.7)
EOSINOPHIL NFR BLD AUTO: 2 %
ERYTHROCYTE [DISTWIDTH] IN BLOOD BY AUTOMATED COUNT: 15.4 %
FASTING PATIENT LIPID ANSWER: YES
FASTING STATUS PATIENT QL REPORTED: YES
GLOBULIN PLAS-MCNC: 2.6 G/DL (ref 2–3.5)
GLUCOSE BLD-MCNC: 99 MG/DL (ref 70–99)
HCT VFR BLD AUTO: 33.4 %
HDLC SERPL-MCNC: 56 MG/DL (ref 40–59)
HGB BLD-MCNC: 10.3 G/DL
IMM GRANULOCYTES # BLD AUTO: 0.01 X10(3) UL (ref 0–1)
IMM GRANULOCYTES NFR BLD: 0.2 %
LDLC SERPL CALC-MCNC: 85 MG/DL (ref ?–100)
LYMPHOCYTES # BLD AUTO: 2.15 X10(3) UL (ref 1–4)
LYMPHOCYTES NFR BLD AUTO: 39.2 %
MCH RBC QN AUTO: 28.5 PG (ref 26–34)
MCHC RBC AUTO-ENTMCNC: 30.8 G/DL (ref 31–37)
MCV RBC AUTO: 92.5 FL
MONOCYTES # BLD AUTO: 0.46 X10(3) UL (ref 0.1–1)
MONOCYTES NFR BLD AUTO: 8.4 %
NEUTROPHILS # BLD AUTO: 2.72 X10 (3) UL (ref 1.5–7.7)
NEUTROPHILS # BLD AUTO: 2.72 X10(3) UL (ref 1.5–7.7)
NEUTROPHILS NFR BLD AUTO: 49.5 %
NONHDLC SERPL-MCNC: 97 MG/DL (ref ?–130)
OSMOLALITY SERPL CALC.SUM OF ELEC: 297 MOSM/KG (ref 275–295)
PLATELET # BLD AUTO: 361 10(3)UL (ref 150–450)
POTASSIUM SERPL-SCNC: 4.1 MMOL/L (ref 3.5–5.1)
PROT SERPL-MCNC: 7 G/DL (ref 5.7–8.2)
RBC # BLD AUTO: 3.61 X10(6)UL
SODIUM SERPL-SCNC: 142 MMOL/L (ref 136–145)
TRIGL SERPL-MCNC: 60 MG/DL (ref 30–149)
TSI SER-ACNC: 0.91 MIU/ML (ref 0.55–4.78)
VLDLC SERPL CALC-MCNC: 10 MG/DL (ref 0–30)
WBC # BLD AUTO: 5.5 X10(3) UL (ref 4–11)

## 2024-09-16 PROCEDURE — 84443 ASSAY THYROID STIM HORMONE: CPT

## 2024-09-16 PROCEDURE — 36415 COLL VENOUS BLD VENIPUNCTURE: CPT

## 2024-09-16 PROCEDURE — 80061 LIPID PANEL: CPT

## 2024-09-16 PROCEDURE — 80053 COMPREHEN METABOLIC PANEL: CPT

## 2024-09-16 PROCEDURE — 85025 COMPLETE CBC W/AUTO DIFF WBC: CPT

## 2024-10-11 ENCOUNTER — OFFICE VISIT (OUTPATIENT)
Dept: UROLOGY | Facility: HOSPITAL | Age: 64
End: 2024-10-11
Attending: OBSTETRICS & GYNECOLOGY
Payer: COMMERCIAL

## 2024-10-11 VITALS — BODY MASS INDEX: 26.06 KG/M2 | HEIGHT: 61 IN | RESPIRATION RATE: 16 BRPM | WEIGHT: 138 LBS

## 2024-10-11 DIAGNOSIS — N81.11 CYSTOCELE, MIDLINE: ICD-10-CM

## 2024-10-11 DIAGNOSIS — N39.41 URGE INCONTINENCE: ICD-10-CM

## 2024-10-11 DIAGNOSIS — N81.84 PELVIC MUSCLE WASTING: Primary | ICD-10-CM

## 2024-10-11 DIAGNOSIS — K59.00 CONSTIPATION, UNSPECIFIED CONSTIPATION TYPE: ICD-10-CM

## 2024-10-11 DIAGNOSIS — N95.2 POSTMENOPAUSAL ATROPHIC VAGINITIS: ICD-10-CM

## 2024-10-11 DIAGNOSIS — R35.1 NOCTURIA: ICD-10-CM

## 2024-10-11 LAB
BLOOD URINE: NEGATIVE
CONTROL RUN WITHIN 24 HOURS?: YES
LEUKOCYTE ESTERASE URINE: NEGATIVE
NITRITE URINE: NEGATIVE

## 2024-10-11 PROCEDURE — 81002 URINALYSIS NONAUTO W/O SCOPE: CPT | Performed by: OBSTETRICS & GYNECOLOGY

## 2024-10-11 PROCEDURE — 87086 URINE CULTURE/COLONY COUNT: CPT | Performed by: OBSTETRICS & GYNECOLOGY

## 2024-10-11 PROCEDURE — 99202 OFFICE O/P NEW SF 15 MIN: CPT

## 2024-10-11 RX ORDER — ESTRADIOL 0.1 MG/G
CREAM VAGINAL
Qty: 42.5 G | Refills: 3 | Status: SHIPPED | OUTPATIENT
Start: 2024-10-11

## 2024-10-11 NOTE — PROGRESS NOTES
Patient presents to follow up pelvic floor sx  Saw MDM last in 2016  Surg 2015  Excision of scar & suture material, enteroele repair, Ant repair, cysto  [place] hyst ?ASC?    She c/o bulge  No [place]  Some UUI  Nocturia x1  Not currently sexually active, limits intercourse given bulge (wants to be sexually active)  Bowels constipated    Denies UTI  No gross hematuria    , vdx2    Has had two surgeries for bulge  Went to PT post op in   Not using vag estrogen    Considering surgery    Resp 16   Ht 61\"   Wt 138 lb (62.6 kg)   BMI 26.07 kg/m²     GEN: NAD  CV: RRR  Pulm: nl effort  Abd: soft    PELVIC EXAM:  Ext. Gen: +atrophy, right labial epidermal inclusion cyst cleared of contents with pressure w/o difficulty  Urethra: +atrophy, nontender  Bladder:+fullness, nontender  Vagina: +atrophy, nontender  Cervix & Uterus: absent  Adnexa:no masses, nontender  Perineum: nontender  Anus: wnl  Rectum: defer    PELVIS FLOOR NEUROMUSCULAR FUNCTION:  Strength:  1 and Unable to hold greater than 3 sec  Perineal Sensation:  Normal    PELVIC SUPPORT:  [place]:  0  Ant:  2  Post:  0  CST:  negative  UVJ: somewhat hypermobile    RN chaperone    Impression/Plan:    ICD-10-CM    1. Pelvic muscle wasting  N81.84       2. Urge incontinence  N39.41 POCT urinalysis dipstick[39994]     Urine Culture, Routine      3. Postmenopausal atrophic vaginitis  N95.2 estradiol (ESTRACE) 0.1 MG/GM Vaginal Cream      4. Nocturia  R35.1       5. Cystocele, midline  N81.11       6. Constipation, unspecified constipation type  K59.00           Discussion Items:   Urodynamics and cystoscopy for evaluation of LUTS  Nonsurgical and surgical treatments for POP  Pelvic muscle rehabilitation including pelvic floor PT  Topical estrogen therapy for treating UGA  Bowel routine/constipation regimen  Surgical Discussion: We discussed the risks, benefits, goals and alternatives to surgical approaches for pelvic floor disorders including, but not limited to,  bleeding, infection, pelvic organ damage, recurrent prolapse, recurrent stress incontinence, de devonte OAB, pain, sexual dysfunction, voiding dysfunction, long-term catheterization and re-operation.  Post-op restrictions and expectations reviewed.    Diagnostic Items:  Urodynamics  Urine testing    Medications Discussed:  Estrace Cream  Fiber  Miralax    Treatment Plan, Non-surgical:   RN teaching/pt education done  Fiber supplement  Stimulant:  MOM, Miralax  Estrace / Premarin cream    Treatment Plan, Surgical: discussed options for mgmt of recurrent vag bulge  Anterior repair  Uterosacral suspension  Sacrocolpopexy-Abd, Robotic, LSC, Vag  [place] Colpocleisis (Partial)    All questions answered  She understands and agrees to plan    Return for UDS, f/u.    Rocío Soliz, DO, FACOG, FACS    The 21st Century Cures Act makes medical notes like these available to patients in the interest of transparency. However, be advised this is a medical document. It is intended as peer to peer communication. It is written in medical language and may contain abbreviations or verbiage that are unfamiliar. It may appear blunt or direct. Medical documents are intended to carry relevant information, facts as evident, and the clinical opinion of the practitioner.

## 2024-10-23 ENCOUNTER — OFFICE VISIT (OUTPATIENT)
Dept: FAMILY MEDICINE CLINIC | Facility: CLINIC | Age: 64
End: 2024-10-23
Payer: COMMERCIAL

## 2024-10-23 ENCOUNTER — LAB ENCOUNTER (OUTPATIENT)
Dept: LAB | Age: 64
End: 2024-10-23
Attending: FAMILY MEDICINE
Payer: COMMERCIAL

## 2024-10-23 VITALS
HEIGHT: 61 IN | WEIGHT: 140 LBS | DIASTOLIC BLOOD PRESSURE: 78 MMHG | OXYGEN SATURATION: 98 % | BODY MASS INDEX: 26.43 KG/M2 | SYSTOLIC BLOOD PRESSURE: 128 MMHG | HEART RATE: 84 BPM

## 2024-10-23 DIAGNOSIS — Z23 NEED FOR PNEUMOCOCCAL 20-VALENT CONJUGATE VACCINATION: ICD-10-CM

## 2024-10-23 DIAGNOSIS — Z29.11 NEED FOR RSV IMMUNIZATION: ICD-10-CM

## 2024-10-23 DIAGNOSIS — Z13.820 SCREENING FOR OSTEOPOROSIS: ICD-10-CM

## 2024-10-23 DIAGNOSIS — Z00.00 LABORATORY EXAMINATION ORDERED AS PART OF A ROUTINE GENERAL MEDICAL EXAMINATION: ICD-10-CM

## 2024-10-23 DIAGNOSIS — E11.9 TYPE 2 DIABETES MELLITUS WITHOUT COMPLICATION, WITHOUT LONG-TERM CURRENT USE OF INSULIN (HCC): ICD-10-CM

## 2024-10-23 DIAGNOSIS — Z00.00 ROUTINE GENERAL MEDICAL EXAMINATION AT A HEALTH CARE FACILITY: Primary | ICD-10-CM

## 2024-10-23 DIAGNOSIS — Z12.31 VISIT FOR SCREENING MAMMOGRAM: ICD-10-CM

## 2024-10-23 LAB
CREAT UR-SCNC: 85.9 MG/DL
EST. AVERAGE GLUCOSE BLD GHB EST-MCNC: 131 MG/DL (ref 68–126)
HBA1C MFR BLD: 6.2 % (ref ?–5.7)
MICROALBUMIN UR-MCNC: 0.4 MG/DL
MICROALBUMIN/CREAT 24H UR-RTO: 4.7 UG/MG (ref ?–30)
VIT B12 SERPL-MCNC: 528 PG/ML (ref 211–911)
VIT D+METAB SERPL-MCNC: 67.5 NG/ML (ref 30–100)

## 2024-10-23 PROCEDURE — 90679 RSV VACC PREF RECOMB ADJT IM: CPT | Performed by: FAMILY MEDICINE

## 2024-10-23 PROCEDURE — 82570 ASSAY OF URINE CREATININE: CPT

## 2024-10-23 PROCEDURE — 99397 PER PM REEVAL EST PAT 65+ YR: CPT | Performed by: FAMILY MEDICINE

## 2024-10-23 PROCEDURE — 83036 HEMOGLOBIN GLYCOSYLATED A1C: CPT

## 2024-10-23 PROCEDURE — 90472 IMMUNIZATION ADMIN EACH ADD: CPT | Performed by: FAMILY MEDICINE

## 2024-10-23 PROCEDURE — 82043 UR ALBUMIN QUANTITATIVE: CPT

## 2024-10-23 PROCEDURE — 82607 VITAMIN B-12: CPT

## 2024-10-23 PROCEDURE — 36415 COLL VENOUS BLD VENIPUNCTURE: CPT

## 2024-10-23 PROCEDURE — 82306 VITAMIN D 25 HYDROXY: CPT

## 2024-10-23 PROCEDURE — 90677 PCV20 VACCINE IM: CPT | Performed by: FAMILY MEDICINE

## 2024-10-23 PROCEDURE — 90471 IMMUNIZATION ADMIN: CPT | Performed by: FAMILY MEDICINE

## 2024-10-23 RX ORDER — TIRZEPATIDE 2.5 MG/.5ML
2.5 INJECTION, SOLUTION SUBCUTANEOUS WEEKLY
Qty: 2 ML | Refills: 11 | Status: SHIPPED | OUTPATIENT
Start: 2024-10-23
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
## 2024-11-02 ENCOUNTER — APPOINTMENT (OUTPATIENT)
Dept: CT IMAGING | Facility: HOSPITAL | Age: 64
End: 2024-11-02
Attending: EMERGENCY MEDICINE
Payer: COMMERCIAL

## 2024-11-02 ENCOUNTER — HOSPITAL ENCOUNTER (EMERGENCY)
Facility: HOSPITAL | Age: 64
Discharge: HOME | End: 2024-11-02
Attending: EMERGENCY MEDICINE
Payer: COMMERCIAL

## 2024-11-02 VITALS
WEIGHT: 140 LBS | TEMPERATURE: 98 F | DIASTOLIC BLOOD PRESSURE: 81 MMHG | RESPIRATION RATE: 20 BRPM | BODY MASS INDEX: 26.43 KG/M2 | OXYGEN SATURATION: 100 % | HEART RATE: 76 BPM | SYSTOLIC BLOOD PRESSURE: 153 MMHG | HEIGHT: 61 IN

## 2024-11-02 DIAGNOSIS — S16.1XXA STRAIN OF NECK MUSCLE, INITIAL ENCOUNTER: Primary | ICD-10-CM

## 2024-11-02 PROCEDURE — 99284 EMERGENCY DEPT VISIT MOD MDM: CPT

## 2024-11-02 PROCEDURE — 70450 CT HEAD/BRAIN W/O DYE: CPT | Performed by: EMERGENCY MEDICINE

## 2024-11-02 PROCEDURE — 72125 CT NECK SPINE W/O DYE: CPT | Performed by: EMERGENCY MEDICINE

## 2024-11-02 RX ORDER — METHYLPREDNISOLONE 4 MG/1
TABLET ORAL
Qty: 1 EACH | Refills: 0 | Status: SHIPPED | OUTPATIENT
Start: 2024-11-02

## 2024-11-02 RX ORDER — ACETAMINOPHEN 500 MG
1000 TABLET ORAL ONCE
Status: COMPLETED | OUTPATIENT
Start: 2024-11-02 | End: 2024-11-02

## 2024-11-02 RX ORDER — CYCLOBENZAPRINE HCL 10 MG
10 TABLET ORAL 3 TIMES DAILY PRN
Qty: 20 TABLET | Refills: 0 | Status: SHIPPED | OUTPATIENT
Start: 2024-11-02 | End: 2024-11-09
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
## 2024-11-11 ENCOUNTER — TELEPHONE (OUTPATIENT)
Dept: UROLOGY | Facility: HOSPITAL | Age: 64
End: 2024-11-11

## 2024-11-11 ENCOUNTER — TELEPHONE (OUTPATIENT)
Dept: UROLOGY | Facility: CLINIC | Age: 64
End: 2024-11-11

## 2024-11-11 NOTE — TELEPHONE ENCOUNTER
TC from patient stating she has UDS scheduled at [place] office tomorrow, and was told by representative at [facility] that she would need prior authorization.  Pt wants to verify that everything is in place before her test tomorrow.  Discussed with Ele and she will contact [place] to verify.  Encouraged to call us with any other questions or concerns.

## 2024-11-12 ENCOUNTER — OFFICE VISIT (OUTPATIENT)
Dept: UROLOGY | Facility: CLINIC | Age: 64
End: 2024-11-12
Attending: OBSTETRICS & GYNECOLOGY
Payer: COMMERCIAL

## 2024-11-12 VITALS
WEIGHT: 140 LBS | BODY MASS INDEX: 26 KG/M2 | DIASTOLIC BLOOD PRESSURE: 78 MMHG | SYSTOLIC BLOOD PRESSURE: 132 MMHG | RESPIRATION RATE: 16 BRPM

## 2024-11-12 DIAGNOSIS — N81.11 CYSTOCELE, MIDLINE: ICD-10-CM

## 2024-11-12 DIAGNOSIS — N39.41 URGE INCONTINENCE: Primary | ICD-10-CM

## 2024-11-12 DIAGNOSIS — N95.2 POSTMENOPAUSAL ATROPHIC VAGINITIS: ICD-10-CM

## 2024-11-12 PROCEDURE — 51729 CYSTOMETROGRAM W/VP&UP: CPT

## 2024-11-12 PROCEDURE — 51741 ELECTRO-UROFLOWMETRY FIRST: CPT

## 2024-11-12 PROCEDURE — 51784 ANAL/URINARY MUSCLE STUDY: CPT

## 2024-11-12 PROCEDURE — 81002 URINALYSIS NONAUTO W/O SCOPE: CPT

## 2024-11-12 PROCEDURE — 51797 INTRAABDOMINAL PRESSURE TEST: CPT

## 2024-11-12 NOTE — PATIENT INSTRUCTIONS
[facility] UROGYNECOLOGY  [address] [address]  PH: [phone]  FAX: [phone]       Urodynamic Testing Discharge Instructions:    There are NO dietary or activity restrictions.  You may resume your normal schedule.      You may have mild discomfort for a few hours after your testing today.  There may be some mild burning when you urinate or you may see some blood in your urine.  These problems should not last more than 24 hours.  The following suggestions may minimize any symptoms you experience.    Drink 6-8 large glasses of water over the next 8 hours  A compress or sitz bath may be soothing  Tylenol or Ibuprofen may be taken as needed    If you experience any of the following, please call the office or, if after hours, the on-call physician at [phone].    Excessive pain  Bright red bloody discharge  Fever or chills  Continued urgency, frequency or burning with urination    Obtaining Test Results    Your urodynamic test will be interpreted by a specialist and available to the referring physician within 7-14 days.  Patients in our clinic are given an appointment to come back to discuss the results and any appropriate treatment recommendations.    Please do not hesitate to contact our office with any questions or concerns at [phone].    I acknowledge that I have received verbal and written discharge  instructions and that I understand these instructions clearly.    Patient Signature:    Date:

## 2024-11-12 NOTE — PROCEDURES
Patient here for urodynamic testing.  Procedure explained and confirmed by patient.  See evaluation form for results.  Both verbal and written discharge instructions were given.  Patient tolerated procedure well and will follow up with Dr. Rocío Soliz on 12/3/24 .    URODYNAMIC EVALUATION    PATIENT HISTORY:    Prolapse:  Yes bothersome - previous hyst/ repair      anterior , enterocele repair   JERSEY:  Yes - not often  UUI: yes, no often  Nocturia:  1  Frequency:  every 2-3 hours  Sense of Incomplete Emptying:  No  Constipation:  No  Last void prior to UDS testin minutes  Current urge to void?  Moderate  OAB meds stopped prior to test?  NA  Other symptoms?  Is not  interested I pessary - leaning towards surgery -   Using [facility]   Surgery?  []  No  [x]  Interested in surgery:   []  Yes, specify date:    Surgical folder provided?  []  Yes  [x]  No     PATIENT DIAGNOSIS:  Cystocele, Midline N81.11 and Urge Incontinence N39.41    UDS PROCEDURAL FINDINGS:  Stress Incontinence N39.3    MEDICATION: Medications Taking[1]     ALLERGIES:  Msg [monosodium glutamate] and Bactrim [sulfamethoxazole w/trimethoprim]      EXAM:  Urinalysis Dip:  Today's Results   Component Date Value    control run 2024 Yes     Blood Urine 2024 Negative     Nitrite Urine 2024 Negative     Leukocyte esterase urine 2024 Negative       Uroves[place] ( >30 degrees ):  [x]  Mobile  []  Fixed    Perineal Sensation:  [x]  Normal  []  Abnormal    Additional Notes:    PROLAPSE:  [x]  Yes  []  No  Prolapse reduced for testing?  [x]  Yes  []  No  []  Pessary  []  Manual  [x]  Half Speculum    Additional Notes:    UROFLOWMETRY:  Unreduced  Voided Volume:                  550        mL  Maximum Flow Rate:                      31      mL/sec  Average flow rate:                     18      mL/sec  Post-void Residual:                 40          mL  Pattern:  [x]  Normal  []  Poor flow     []  Intermittent  []   Other  Void:   [x]  Typical  []  Atypical    Additional Notes:    CYSTOMETRY:  Urethral Catheter:  Fr 7 / tdoc  Abd Catheter:     Fr 7 / tdoc   Infusion:  Water Rate 30  - 50 mL/min  Temp:  Room  Position:  [x]  Sit  []  Stand  []  Supine  First sensation:   225 mL  First desire to void:   300 mL  Strong desire to void:  492 mL  Maximum cystometric capacity:   600 mL  Detrusor Activity:  []  Unstable   [x]  Stable  Urge leakage?    []  Yes [x]  No  Volume at 1st unhibited detrusor cont:    mL  Detrusor instability provoked by:    []  Spontaneous []  Coughing  []  Filling  []  Valsalva  []  Other    Additional Notes:      URETHRAL FUNCTION:  Valsava (vesical) Leak Point Pressures:    Volume Leak Point Pressure Leak?    Cough Valsalva      100mL 146 52    cm H2O []  Yes [x]  No         REDUCED: half speculum  Volume Leak Point Pressure Leak?    Cough Valsalva      100mL 124 67    cm H2O []  Yes [x]  No   200mL 106 24    cm H2O [x]  Yes []  No       Genuine Stress Incontinence demonstrated?   [x]  Yes @ 200 ml reduced with cough  []  No    Resting Urethral Pressure Profile:     Functional Urethral Length: manual pull  Maximum UCP:          54 cm       56      cm       PRESSURE/FLOW STUDY:  Unreduced  Voided volume:       544 + 150 BR void =  694 mL  Maximum flow rate:       18 mL/sec  Pressure Detrusor (at maximum flow):           34 cm H2O  Post void residual:             65  mL  Voiding mechanism:  []  Abnormal  [x]  Normal  []  Strain to void   []  Weak detrusor  Void:   [x]  Typical   []  Atypical    Additional Notes:  Uroflowmetry, cystometry, and pressure / flow study were completed using calibrated electronic equipment and air charged transducers.    EMG:  During fill: Reactive    During flow study: Reactive    11/12/2024 3:03 PM     PERFORMED BY:  Lenore CARABALLO RN        URODYNAMIC PHYSICIAN INTERPRETATION    IMPRESSION:   550/40cc & 694/65cc  correction 600cc  No DO  JERSEY @ 200 ml reduced with cough   Post-Procedure  Diagnoses: Stress urinary incontinence [N39.3]    Comments:      Rocío Soliz DO   11/12/2024   3:30 PM           [1]   Outpatient Medications Marked as Taking for the 11/12/24 encounter (Office Visit) with [facility] PROCEDURE   Medication Sig Dispense Refill    Tirzepatide (MOUNJARO) 2.5 MG/0.5ML Subcutaneous Solution Auto-injector Inject 2.5 mg into the skin once a week. 2 mL 11    estradiol (ESTRACE) 0.1 MG/GM Vaginal Cream Apply 0.5 gram vaginally 2 times per week. 42.5 g 3    metFORMIN HCl 1000 MG Oral Tab Take 1 tablet (1,000 mg total) by mouth 2 (two) times daily with meals. 180 tablet 0    atorvastatin 20 MG Oral Tab TAKE 1 TABLET BY MOUTH 3 TIMES A WEEK. 36 tablet 3    escitalopram 5 MG Oral Tab Take 1 tablet (5 mg total) by mouth daily. 90 tablet 3    ascorbic acid 500 MG Oral Tab Take 1 tablet (500 mg total) by mouth daily.      VITAMIN E daily      CALCIUM 1200 3744-3326 MG-UNIT OR CHEW 1 TABLET TID      FISH OIL 1000 MG OR CAPS 1 TABLETS TID      ASPIRIN 81 MG OR TABS 1 TABLET DAILY      SLOW FE OR 1 DAILY occasional

## 2024-11-14 DIAGNOSIS — E11.9 DIABETES MELLITUS WITHOUT COMPLICATION (HCC): ICD-10-CM

## 2024-11-14 RX ORDER — BLOOD SUGAR DIAGNOSTIC
STRIP MISCELLANEOUS
Qty: 100 STRIP | Refills: 3 | Status: SHIPPED | OUTPATIENT
Start: 2024-11-14

## 2024-11-14 RX ORDER — ATORVASTATIN CALCIUM 20 MG/1
20 TABLET, FILM COATED ORAL
Qty: 48 TABLET | Refills: 3 | Status: SHIPPED | OUTPATIENT
Start: 2024-11-14

## 2024-11-14 NOTE — TELEPHONE ENCOUNTER
Patient comment: Dr Lobo increased  this to 4x/week, so I have run out prematurely.       Left message for patient to call back to confirm directions for Atorvastatin

## 2024-11-14 NOTE — TELEPHONE ENCOUNTER
Patient called back to request a refill on lipitor.  Dr. Francis had originally prescribed 3 tab weekly however Dr. Lobo wanted to increase to 5 but Dr. Francis recommended 4 tabs weekly.  Patient will be out of medication this week.      Dr. Francis - can you please refill lipitor?

## 2024-11-29 RX ORDER — OMEPRAZOLE 40 MG/1
40 CAPSULE, DELAYED RELEASE ORAL DAILY
Qty: 90 CAPSULE | Refills: 3 | Status: SHIPPED | OUTPATIENT
Start: 2024-11-29

## 2024-12-02 NOTE — TELEPHONE ENCOUNTER
LF-8/30/2024  LOV-10/23/2024   Pt alert and oriented, VSS, no complaints of pain, CIWA score 8 at 8am, pt medicated, pt instructed on plan of care, report given to 5S

## 2024-12-04 ENCOUNTER — VIRTUAL PHONE E/M (OUTPATIENT)
Dept: UROLOGY | Facility: HOSPITAL | Age: 64
End: 2024-12-04
Attending: OBSTETRICS & GYNECOLOGY
Payer: COMMERCIAL

## 2024-12-04 DIAGNOSIS — N81.84 PELVIC MUSCLE WASTING: ICD-10-CM

## 2024-12-04 DIAGNOSIS — N39.3 FEMALE STRESS INCONTINENCE: ICD-10-CM

## 2024-12-04 DIAGNOSIS — N81.11 CYSTOCELE, MIDLINE: Primary | ICD-10-CM

## 2024-12-04 DIAGNOSIS — N95.2 POSTMENOPAUSAL ATROPHIC VAGINITIS: ICD-10-CM

## 2024-12-04 NOTE — PROGRESS NOTES
Pt presents w/ initial c/o bulge  Urodynamic testing undergone without complication.  Results reviewed with patient via telephone  This visit is being conducted as a televisit with pt's consent.  Pt in safe, private environment for televisit, provider located in the office setting    550/40cc & 694/65cc  retirement 600cc  No DO  JERSEY @ 200 ml reduced with cough     Discussed with patient mgmt options for cystocele  Biggest bother is bulge  Used pessary in pregnancy  PT in past  Using vag estrogen  Doesn't want colpocleisis    Discussed management of pelvic organ prolapse including but not limited to behavioral modifications, conservative options, and surgical management.   Discussed pessary management including benefits and risks. Discussed importance of keeping regularly scheduled pessary checks in prevention of complications related to pessary use.     Discussed management options for JERSEY including expectant management, pelvic floor PT, pessary, and surgery  Discussed risks and benefits associated with each option  Discussed urodynamic testing & results    Thorough discussion of surgical risks, benefits, and alternatives including, but not limited to bleeding/clots, infection, injury to nearby organs (urethra, bladder, ureters, bowel, blood vessels), mesh erosion, dyspareunia, de devonte UUI, worsening JERSEY, recurrence, voiding dysfunction, and pain.    Discussed mgmt of vulvovaginal atrophy with vaginal estrogen cream. Reviewed associated benefits, risks, alternatives, and goals. Recommend low dose twice weekly mgmt     Bowel management reviewed. Discussed using fiber daily w/ addition of miralax as needed    All questions answered.  Pt will proceed vag estrogen, pessary trial, PFPT    Follow up pessary trial    Dr. Rocío Soliz

## 2024-12-04 NOTE — PATIENT INSTRUCTIONS
Name Address [facility] Phone/Fax Contact   [facility] [address]  [address]   [facility]   [address] PH: [phone]  FAX: [phone]   Crista Granados     [facility] [address], 2nd [address]   [facility]   [address] PH: [phone]  FAX: [phone]    [facility]   [address]   [place]   [place]   [address]   PH: [phone]    [facility] [address]   [facility]   [address] PH: [phone]  FAX: [phone]   Inge Forbes   [facility]   [address]    [facility]    [address] PH: [phone]  FAX: [phone] Kristofer Ramirez [facility]   [facility] [place]   [address]     [facility]     [address]   PH: [phone]  FAX: [phone]   Cristy Handy   [facility] [address]  [address]   [facility]   [address] PH: [phone]  FAX: [phone]   Bettina Combs   [facility]    [address]   [facility]   [address]   PH: [phone]   Vernon Wadsowrth     [facility] [address]   [facility]   [address] PH: [phone]  FAX: [phone] [facility]  [facility]   [address]   [facility]   [address] PH: [phone]  FAX: [phone]        [facility]   [address]   [facility]   [address] PH: [phone]  FAX: [phone]   Liane Fields   [facility] [address]  [address]   [facility]   [address] PH: [phone]  FAX: [phone]   Collette Robledo   [facility]   [address]   [facility]   [address] PH: [phone]  FAX: [phone] Deedee Snowden  [facility] [address] [place] [address] Dr. Saleh  300   [facility]   [address] PH: [phone]  FAX: [phone] Collette Haile  Gina Felipe   [facility] [address]   [facility]   [address] PH: [phone]  FAX: [phone]   Sammie Jefferson     [facility] [address]   [facility]   [address] PH: [phone]  FAX: [phone]   Cielo Mauro   [facility] [address]   [facility]   [address] PH: [phone]  FAX: [phone]   Kaitlin Buckner     [facility] [address] [facility]   [address] PH: [phone]  FAX: [phone]      [facility]   [address] [facility]   [address]   PH: [phone]   Mireille Cervantes     [facility] [address] [facility]   [address] PH: [phone]  FAX: [phone]   [facility] [address] [facility]   [address] PH: [phone]  FAX: [phone] Senait Mixon [facility] [address]  [address]   [facility]   [address]   PH: [phone] Glen[facility] Jimenez [facility] [address] [facility]   [address] PH: [phone]  FAX: [phone]    [facility]   [address] [facility]   [address] PH: [phone]  FAX: [phone]   Hailey Vides     [facility] [address] [facility]   [address] PH: [phone]  FAX: [phone]      [facility]   [address] [facility]   [address] PH: [phone]  FAX: [phone]    [facility] [address]  [address]   [facility]   [address] PH: [phone]  FAX: [phone]   Adán Hook   [facility]   [address] [facility]   [address] PH: [phone]  FAX: [phone]   [facility] Carter [facility] [address] [place] [address] Dr. [address]. 110   [facility]    [address] PH: [phone]  FAX: [phone]   Awa Toussaint   [facility]   [address]   [place]   [place]   [address] PH: [phone]  FAX: [phone] Maureen Rodriguezyojanaizaiah Jarad   [facility]   [address]   [facility]   [address] PH: [phone]  FAX: [phone]   [facility]   [address]   [facility]   [address] PH: [phone]  FAX: [phone] [facility] Bh[facility]     [facility]   [address] [facility]   [address] PH: [phone]  FAX: [phone]      [facility]   [address] [facility]   [address] PH: [phone]  FAX: [phone] Kristin Whitehead [facility]   [address] [facility]   [address]   PH: [phone] Janis Mcguire   [facility]   [address]   [facility]   [address] PH: [phone]  FAX: [phone] Liane Harris [facility]   [address]   [place]   [place]   [address] PH: [phone]  FAX: [phone]   Luz Elena Woods   [facility]   [address]   [facility]   [address]   PH: [phone]    [facility] [address]  Lower Level   [place]   [place]   [address]   PH: [phone]      [facility]   [address] [place]   [place]   [address] PH: [phone]  FAX: [phone]      [facility] [address] [facility]  [address]   [facility]   [address] PH: [phone]  FAX: [phone]   Rosalind Melissa   [facility] [address] [facility] [address]   Tyrone ALLEN   [address] PH: [phone]  FAX: [phone]   Nick Lowe     [facility] [address] [place] [address].  [address]   Tyrone ALLEN   [address]   PH: [phone] Soledad Carmichaellife  Physical Therapy [address]   [place]   [address] PH: [phone]  FAX: [phone]   Estee Mancuso   [facility]   [address]   [facility]   [address] PH: [phone]  FAX: [phone]   Leah Bermudez     [facility] [address]  [address]   [facility]   [address] PH:       [facility]   [address] [facility]   [address] PH: [phone]  FAX: [phone]    [facility] [address] [facility]. 2nd Floor   [facility]   [address]   PH: [phone]    [facility] [address] [facility]  [address]   [facility]   [address] PH: [phone]  FAX: [phone] Wendie Freed   [facility]   [address] [facility]   [address] PH: [phone]  FAX: [phone] Rocío Zeng     [facility]   [address] [facility]   [address] PH: [phone]  FAX: [phone]      [facility]   [address] [facility]   [place]   [address] PH: [phone]  FAX: [phone]    [facility] [address]  [address]   [place]   [address] PH: [phone]  FAX: [phone] Zahira Banerjeeinal     [facility] [address]   [facility]   [address] PH: [phone]  FAX: [phone]   Rocío Marin   [facility] [address] [place] [address] [address]Loli [address]   [facility]   [address] PH: [phone]  FAX: [phone] Fay Musa [facility] [address]  [address]   [facility]   [address] PH: [phone]  FAX: [phone] Katherine Major  Critical access hospital   [address] [facility] [address]   [place]   [address] PH: [phone]  FAX: [phone]   [facility] Clinic [address] [place] [address] 121   [place]   [place]   [address] PH:  [phone]  FAX: [phone]   Collette Rodriguez     [facility] [address]  [address]   [facility]   [address]   PH: [phone]   Mary Carmen Sosa   [facility] [address]  [address]   [facility]   [address] PH: [phone]  FAX: [phone] Ashish Zeng     [facility] [address]   [facility]   [address] PH: [phone]  FAX: [phone]   [facility]   [address]   [facility]   [address] PH: [phone]  FAX: [phone]   Nga Thapa     [facility] [address]   [facility]   [address] PH: [phone]  FAX: [phone]    [facility] [address]   [facility]   [address] PH: [phone]  FAX: [phone]      [facility]   [address]   [facility]   [address] PH: [phone]  FAX: [phone]    [facility]   [address]   [facility]   [address] PH: [phone]  FAX: [phone] Laura Leroy     [facility]   [address]   [facility]   [address] PH: [phone]  FAX: [phone]   Sissy Gross   [facility]   [address]   [facility]   [address] PH: [phone]  FAX: [phone] Katherine Keane     [facility]   [address]   [facility]   [address] PH: [phone]  FAX: [phone]      [facility] [address] [facility]   [facility]   [address] PH: [phone]  FAX: [phone] [facility]   [facility] [address] [address] [place] [address]. A [address]   [facility]   [address] PH: [phone]  FAX: [phone] Ligia Bruce Sienna     [facility]   [address] [facility].   [facility]   [address] PH:  [phone]  FAX: [phone]    [facility] [address]  [address]   [facility]   [address] PH: [phone]  FAX: [phone]   Keke Bill   [facility] Physical [facility] Clinic [address]   [facility]   [address] PH: [phone]  FAX: [phone]   [facility] [address]  [address]   [facility]   [address]   PH: [phone]      [facility]   [address] [place]   [place]   [address] PH: [phone]  FAX: [phone]   Karyn Minaya   [facility] [address]  [address]   [facility]   [address] PH: [phone]  FAX: [phone] Kavitha Nayak   [facility] Physical Therapy Clinic [address]   [facility]   [address] PH: [phone]  FAX: [phone]   Laura Pang     [facility] [address]   [facility]   [address] PH: [phone]  FAX: [phone]   Leyla Uriostegui   [facility] [address]   [facility]   [address]   PH: [phone]      [facility] [address]   [facility]   [address] PH: [phone]  FAX: [phone]    [facility] Physical [facility] Clinic [address]  [address]   [facility]    [address] PH: [phone]  FAX: [phone] Marylou Oropeza   [facility] [address]  [address]   [facility]   [address] PH: [phone]  FAX: [phone]   Charlee Bustamante   [facility] [address]   [facility]   [address] PH: [phone]  FAX: [phone]   Kiko Chavez

## 2024-12-05 ENCOUNTER — HOSPITAL ENCOUNTER (OUTPATIENT)
Dept: MAMMOGRAPHY | Age: 64
Discharge: HOME | End: 2024-12-05
Attending: FAMILY MEDICINE
Payer: COMMERCIAL

## 2024-12-05 DIAGNOSIS — Z12.31 VISIT FOR SCREENING MAMMOGRAM: ICD-10-CM

## 2024-12-05 PROCEDURE — 77067 SCR MAMMO BI INCL CAD: CPT | Performed by: FAMILY MEDICINE

## 2024-12-05 PROCEDURE — 77063 BREAST TOMOSYNTHESIS BI: CPT | Performed by: FAMILY MEDICINE

## 2024-12-18 ENCOUNTER — OFFICE VISIT (OUTPATIENT)
Dept: UROLOGY | Facility: CLINIC | Age: 64
End: 2024-12-18
Attending: OBSTETRICS & GYNECOLOGY
Payer: COMMERCIAL

## 2024-12-18 VITALS — BODY MASS INDEX: 26 KG/M2 | TEMPERATURE: 98 F | RESPIRATION RATE: 16 BRPM | HEIGHT: 61 IN

## 2024-12-18 DIAGNOSIS — N95.2 POSTMENOPAUSAL ATROPHIC VAGINITIS: ICD-10-CM

## 2024-12-18 DIAGNOSIS — N81.84 PELVIC MUSCLE WASTING: ICD-10-CM

## 2024-12-18 DIAGNOSIS — N39.3 FEMALE STRESS INCONTINENCE: ICD-10-CM

## 2024-12-18 DIAGNOSIS — N81.11 CYSTOCELE, MIDLINE: Primary | ICD-10-CM

## 2024-12-18 PROCEDURE — 99212 OFFICE O/P EST SF 10 MIN: CPT

## 2024-12-18 PROCEDURE — 57160 INSERT PESSARY/OTHER DEVICE: CPT

## 2024-12-18 NOTE — PROGRESS NOTES
Pt here for pessary trial due to bulge    Wants to exhaust conservative options    Pt is using vaginal cream 2x weekly     She is not currently interested in surgical management of her pelvic organ prolapse  Desires home care    UDS 12/4/24  550/40cc & 694/65cc  penitentiary 600cc  No DO  JERSEY @ 200 ml reduced with cough     Urogynecology Summary:  Urogynecology Summary  Prolapse: Yes  JERSEY: No  Urge Incontinence: Yes  Nocturia Frequency: 1  Frequency: Greater than 3 hours (every 3-4 hours)  Incomplete emptying: No  Constipation: Yes  Wears pad day?: 0  Wears Pad Night?: 0  Activities are limited by UI/POP?: Yes (bulge)  Currently Sexually Active: No  Avoids sexual activity due to: Other (pain and bulge)    Vitals:  Vitals:    12/18/24 1359   Resp: 16   Temp: 97.9 °F (36.6 °C)       GENERAL EXAM:  GENERAL: alert & oriented, NAD  HEENT: NC/AT, sclera without injection  SKIN: no rashes  LUNGS:  without increased respiratory effort  ABDOMEN: soft, non-tender, non-distended, no masses appreciated  EXT: no edema    PELVIC EXAM: HAZEL Mitchell, present for exam as a chaperone  Ext. Gen: +atrophy, no lesions  Urethra: +atrophy, nontender  Bladder:+fullness, nontender  Vagina: +atrophy, nontender  Cervix & Uterus: absent  Adnexa:no masses, nontender  Perineum: nontender  Anus: wnl  Rectum: defer     PELVIS FLOOR NEUROMUSCULAR FUNCTION:  Strength:  1 and Unable to hold greater than 3 sec  Perineal Sensation:  Normal     PELVIC SUPPORT:  [place]:  0  Ant:  2  Post:  0  CST:  negative  UVJ: somewhat hypermobile      A #2 small knob incontinence dish pessary was placed without difficulty, +leaking with cough, removed  A #1 ring with support with knob pessary was placed without difficulty, patient stated it was comfortable and supportive.  Able to void freely.  Able to remove and insert independently.      Impression:    ICD-10-CM    1. Cystocele, midline  N81.11       2. Female stress incontinence  N39.3       3. Pelvic muscle wasting  N81.84        4. Postmenopausal atrophic vaginitis  N95.2           Discussion Items:   Discussed mgmt of vulvovaginal atrophy with vaginal estrogen cream. Reviewed associated benefits, risks, alternatives, and goals. Recommend low dose 2x/week mgmt.    Discussed management of pelvic organ prolapse including but not limited to behavioral modifications, conservative options, and surgical management.   Discussed pessary management including benefits and risks. Discussed importance of keeping regularly scheduled pessary checks in prevention of complications related to pessary use.     Treatment Plan:  Continue pessary management, home care  Continue low dose vag estrogen as prescribed  Call with s/sx of UTI, problems with pessary   All questions answered  She understands and agrees to plan    Return in about 4 weeks (around 1/15/2025) for pessary check.      Estee Marti PA-C    Note to patient: The 21st Century Cures Act makes medical notes like these available to patients in the interest of transparency.  However, be advised this is a medical document.  It is intended as peer to peer communication.  It is written in medical language and may contain abbreviations or verbiage that are unfamiliar.  It may appear blunt or direct.  Medical documents are intended to carry relevant information, facts as evident, and the clinical opinion of the practitioner.

## 2024-12-19 ENCOUNTER — MED REC SCAN ONLY (OUTPATIENT)
Dept: FAMILY MEDICINE CLINIC | Facility: CLINIC | Age: 64
End: 2024-12-19

## 2024-12-26 RX ORDER — OMEPRAZOLE 40 MG/1
40 CAPSULE, DELAYED RELEASE ORAL DAILY
Qty: 90 CAPSULE | Refills: 3 | Status: SHIPPED | OUTPATIENT
Start: 2024-12-26

## 2024-12-26 RX ORDER — ESCITALOPRAM OXALATE 5 MG/1
5 TABLET ORAL DAILY
Qty: 90 TABLET | Refills: 3 | Status: SHIPPED | OUTPATIENT
Start: 2024-12-26

## 2025-02-03 RX ORDER — TIRZEPATIDE 2.5 MG/.5ML
2.5 INJECTION, SOLUTION SUBCUTANEOUS WEEKLY
Qty: 2 ML | Refills: 11 | Status: SHIPPED | OUTPATIENT
Start: 2025-02-03

## 2025-02-03 RX ORDER — ATORVASTATIN CALCIUM 20 MG/1
20 TABLET, FILM COATED ORAL
Qty: 48 TABLET | Refills: 3 | Status: SHIPPED | OUTPATIENT
Start: 2025-02-04

## 2025-02-05 ENCOUNTER — TELEPHONE (OUTPATIENT)
Dept: FAMILY MEDICINE CLINIC | Facility: CLINIC | Age: 65
End: 2025-02-05

## 2025-02-05 ENCOUNTER — TELEPHONE (OUTPATIENT)
Dept: INTERNAL MEDICINE CLINIC | Facility: CLINIC | Age: 65
End: 2025-02-05

## 2025-02-05 DIAGNOSIS — E11.9 DIABETES MELLITUS WITHOUT COMPLICATION (HCC): Primary | ICD-10-CM

## 2025-02-05 RX ORDER — TIRZEPATIDE 2.5 MG/.5ML
2.5 INJECTION, SOLUTION SUBCUTANEOUS WEEKLY
Qty: 2 ML | Refills: 11 | Status: SHIPPED | OUTPATIENT
Start: 2025-02-05

## 2025-02-05 RX ORDER — TIRZEPATIDE 2.5 MG/.5ML
2.5 INJECTION, SOLUTION SUBCUTANEOUS WEEKLY
Qty: 2 ML | Refills: 11 | Status: SHIPPED | OUTPATIENT
Start: 2025-02-05 | End: 2025-02-05

## 2025-02-05 NOTE — TELEPHONE ENCOUNTER
Korina from [facility] called to request an IC 10 code for the Mounjaro refill    17 medical assists - do you know IC 10 code for Mounjaro refill

## 2025-02-05 NOTE — TELEPHONE ENCOUNTER
Tirzepatide (MOUNJARO) 2.5 MG/0.5ML Subcutaneous Solution Auto-injector     Patient is in need of a PA and needs the medication [facility]

## 2025-02-06 NOTE — TELEPHONE ENCOUNTER
[place] Medicare Rx [employer]    ID #: [id number]  RxBIN: [id number]  RxPCN: [id number]  RxGROUP: [id number]

## 2025-02-10 NOTE — TELEPHONE ENCOUNTER
Approved on February 6 by [employer] 2017 NCPDP  Request Reference Number: [id number]. MOUNJARO INJ 2.5/0.5 is approved through 12/31/2025. Your patient may now fill this prescription and it will be covered.  Authorization Expiration Date: 12/31/2025

## 2025-03-03 ENCOUNTER — OFFICE VISIT (OUTPATIENT)
Dept: UROLOGY | Facility: CLINIC | Age: 65
End: 2025-03-03
Attending: OBSTETRICS & GYNECOLOGY
Payer: MEDICARE

## 2025-03-03 VITALS — RESPIRATION RATE: 16 BRPM | TEMPERATURE: 97 F | HEIGHT: 61 IN | BODY MASS INDEX: 26 KG/M2

## 2025-03-03 DIAGNOSIS — N95.2 POSTMENOPAUSAL ATROPHIC VAGINITIS: ICD-10-CM

## 2025-03-03 DIAGNOSIS — N39.3 FEMALE STRESS INCONTINENCE: ICD-10-CM

## 2025-03-03 DIAGNOSIS — N81.11 CYSTOCELE, MIDLINE: Primary | ICD-10-CM

## 2025-03-03 DIAGNOSIS — N81.84 PELVIC MUSCLE WASTING: ICD-10-CM

## 2025-03-03 PROCEDURE — 99212 OFFICE O/P EST SF 10 MIN: CPT

## 2025-03-03 NOTE — PROGRESS NOTES
Pt presents for f/u of pelvic organ prolapse, pessary care  Using #1 ring with support with knob, home care    Pt removed pessary this AM    Reports pessary is comfortable   Denies discharge  Denies bleeding  Denies s/sx of UTI  Bowels reg  Pt is removing pessary at home once weekly  Pt is using vaginal estrogen cream 2x weekly    Happy with pessary, feels supported  She is not currently interested in surgical management of her pelvic organ prolapse    UDS 12/4/24  550/40cc & 694/65cc  long-term 600cc  No DO  JERSEY @ 200 ml reduced with cough     Urogynecology Summary:  Urogynecology Summary  Prolapse: No  JERSEY: No  Urge Incontinence: No  Nocturia Frequency: 2 (1-2)  Frequency: 2 - 3 hours (2-3)  Incomplete emptying: No  Constipation: No  Wears pad day?: 0  Wears Pad Night?: 0  Activities are limited by UI/POP?: No      Vitals:  Vitals:    03/03/25 1428   Resp: 16   Temp: 97 °F (36.1 °C)       GENERAL EXAM:  GENERAL: alert & oriented, NAD  HEENT: NC/AT, sclera without injection  SKIN: no rashes  LUNGS:  without increased respiratory effort  ABDOMEN: soft, non-tender, non-distended, no masses appreciated  EXT: no edema    PELVIC EXAM: HAZEL Mitchell, present for exam as a chaperone  Ext. Gen: +atrophy, no lesions  Urethra: +atrophy, nontender  Bladder: no fullness, nontender  Vagina: +atrophy, nontender  Cervix & Uterus: absent  Adnexa:no masses, nontender  Perineum: nontender  Anus: wnl  Rectum: defer     PELVIS FLOOR NEUROMUSCULAR FUNCTION:  Strength:  1 and Unable to hold greater than 3 sec  Perineal Sensation:  Normal     PELVIC SUPPORT:  [place]:  0  Ant:  2  Post:  0  CST:  negative  UVJ: somewhat hypermobile    Pt's pessary was inserted without difficulty    Impression:    ICD-10-CM    1. Cystocele, midline  N81.11       2. Female stress incontinence  N39.3       3. Pelvic muscle wasting  N81.84       4. Postmenopausal atrophic vaginitis  N95.2           Discussion Items:   Discussed mgmt of vulvovaginal atrophy with vaginal  estrogen cream. Reviewed associated benefits, risks, alternatives, and goals. Recommend low dose 2x weekly mgmt.     Discussed management of pelvic organ prolapse including but not limited to behavioral modifications, conservative options, and surgical management.   Discussed pessary management including benefits and risks. Discussed importance of keeping regularly scheduled pessary checks in prevention of complications related to pessary use.     Discussed use of pessary assistant tool    Treatment Plan:  Continue pessary management, home care  Continue vag estrogen as prescribed  Encouraged daily pelvic exercises  Cont bowel regimen  Call with s/sx of UTI, problems with pessary     All questions answered  Pt understands and agrees to plan       Return in about 1 year (around 3/3/2026) for pessary check (sooner PRN).      Estee Marti PA-C    Note to patient: The 21st Century Cures Act makes medical notes like these available to patients in the interest of transparency.  However, be advised this is a medical document.  It is intended as peer to peer communication.  It is written in medical language and may contain abbreviations or verbiage that are unfamiliar.  It may appear blunt or direct.  Medical documents are intended to carry relevant information, facts as evident, and the clinical opinion of the practitioner.

## 2025-03-04 ENCOUNTER — LAB ENCOUNTER (OUTPATIENT)
Dept: LAB | Age: 65
End: 2025-03-04
Attending: INTERNAL MEDICINE
Payer: MEDICARE

## 2025-03-04 DIAGNOSIS — E11.69 DIABETES MELLITUS ASSOCIATED WITH HORMONAL ETIOLOGY (HCC): Primary | ICD-10-CM

## 2025-03-04 DIAGNOSIS — R53.83 FATIGUE: ICD-10-CM

## 2025-03-04 DIAGNOSIS — D50.9 IRON DEFICIENCY ANEMIA, UNSPECIFIED: ICD-10-CM

## 2025-03-04 DIAGNOSIS — R06.09 DOE (DYSPNEA ON EXERTION): ICD-10-CM

## 2025-03-04 LAB
ALBUMIN SERPL-MCNC: 4.2 G/DL (ref 3.2–4.8)
ALBUMIN/GLOB SERPL: 1.8 {RATIO} (ref 1–2)
ALP LIVER SERPL-CCNC: 67 U/L
ALT SERPL-CCNC: 12 U/L
ANION GAP SERPL CALC-SCNC: 8 MMOL/L (ref 0–18)
AST SERPL-CCNC: 17 U/L (ref ?–34)
BASOPHILS # BLD AUTO: 0.03 X10(3) UL (ref 0–0.2)
BASOPHILS NFR BLD AUTO: 0.7 %
BILIRUB SERPL-MCNC: 0.4 MG/DL (ref 0.2–1.1)
BUN BLD-MCNC: 14 MG/DL (ref 9–23)
CALCIUM BLD-MCNC: 8.5 MG/DL (ref 8.7–10.6)
CHLORIDE SERPL-SCNC: 106 MMOL/L (ref 98–112)
CHOLEST SERPL-MCNC: 165 MG/DL (ref ?–200)
CO2 SERPL-SCNC: 29 MMOL/L (ref 21–32)
CREAT BLD-MCNC: 0.7 MG/DL
EGFRCR SERPLBLD CKD-EPI 2021: 96 ML/MIN/1.73M2 (ref 60–?)
EOSINOPHIL # BLD AUTO: 0.09 X10(3) UL (ref 0–0.7)
EOSINOPHIL NFR BLD AUTO: 2.2 %
ERYTHROCYTE [DISTWIDTH] IN BLOOD BY AUTOMATED COUNT: 15.2 %
EST. AVERAGE GLUCOSE BLD GHB EST-MCNC: 134 MG/DL (ref 68–126)
FASTING PATIENT LIPID ANSWER: YES
FASTING STATUS PATIENT QL REPORTED: YES
GLOBULIN PLAS-MCNC: 2.4 G/DL (ref 2–3.5)
GLUCOSE BLD-MCNC: 103 MG/DL (ref 70–99)
HBA1C MFR BLD: 6.3 % (ref ?–5.7)
HCT VFR BLD AUTO: 33.8 %
HDLC SERPL-MCNC: 46 MG/DL (ref 40–59)
HGB BLD-MCNC: 10.8 G/DL
IMM GRANULOCYTES # BLD AUTO: 0.01 X10(3) UL (ref 0–1)
IMM GRANULOCYTES NFR BLD: 0.2 %
LDLC SERPL CALC-MCNC: 106 MG/DL (ref ?–100)
LYMPHOCYTES # BLD AUTO: 1.85 X10(3) UL (ref 1–4)
LYMPHOCYTES NFR BLD AUTO: 45.5 %
MCH RBC QN AUTO: 29.8 PG (ref 26–34)
MCHC RBC AUTO-ENTMCNC: 32 G/DL (ref 31–37)
MCV RBC AUTO: 93.1 FL
MONOCYTES # BLD AUTO: 0.31 X10(3) UL (ref 0.1–1)
MONOCYTES NFR BLD AUTO: 7.6 %
NEUTROPHILS # BLD AUTO: 1.78 X10 (3) UL (ref 1.5–7.7)
NEUTROPHILS # BLD AUTO: 1.78 X10(3) UL (ref 1.5–7.7)
NEUTROPHILS NFR BLD AUTO: 43.8 %
NONHDLC SERPL-MCNC: 119 MG/DL (ref ?–130)
OSMOLALITY SERPL CALC.SUM OF ELEC: 297 MOSM/KG (ref 275–295)
PLATELET # BLD AUTO: 306 10(3)UL (ref 150–450)
POTASSIUM SERPL-SCNC: 4 MMOL/L (ref 3.5–5.1)
PROT SERPL-MCNC: 6.6 G/DL (ref 5.7–8.2)
RBC # BLD AUTO: 3.63 X10(6)UL
SODIUM SERPL-SCNC: 143 MMOL/L (ref 136–145)
TRIGL SERPL-MCNC: 68 MG/DL (ref 30–149)
TSI SER-ACNC: 0.99 UIU/ML (ref 0.55–4.78)
VLDLC SERPL CALC-MCNC: 11 MG/DL (ref 0–30)
WBC # BLD AUTO: 4.1 X10(3) UL (ref 4–11)

## 2025-03-04 PROCEDURE — 85025 COMPLETE CBC W/AUTO DIFF WBC: CPT

## 2025-03-04 PROCEDURE — 83036 HEMOGLOBIN GLYCOSYLATED A1C: CPT

## 2025-03-04 PROCEDURE — 80053 COMPREHEN METABOLIC PANEL: CPT

## 2025-03-04 PROCEDURE — 84443 ASSAY THYROID STIM HORMONE: CPT

## 2025-03-04 PROCEDURE — 80061 LIPID PANEL: CPT

## 2025-03-04 PROCEDURE — 36415 COLL VENOUS BLD VENIPUNCTURE: CPT

## 2025-03-10 DIAGNOSIS — E11.9 DIABETES MELLITUS WITHOUT COMPLICATION (HCC): ICD-10-CM

## 2025-03-10 DIAGNOSIS — E11.9 TYPE 2 DIABETES MELLITUS WITHOUT COMPLICATION, WITHOUT LONG-TERM CURRENT USE OF INSULIN (HCC): Primary | ICD-10-CM

## 2025-03-10 RX ORDER — BLOOD SUGAR DIAGNOSTIC
STRIP MISCELLANEOUS
Qty: 100 STRIP | Refills: 3 | Status: SHIPPED | OUTPATIENT
Start: 2025-03-10

## 2025-03-10 RX ORDER — BLOOD SUGAR DIAGNOSTIC
STRIP MISCELLANEOUS
Qty: 100 STRIP | Refills: 3 | Status: SHIPPED | OUTPATIENT
Start: 2025-03-10 | End: 2025-03-10

## 2025-03-10 NOTE — TELEPHONE ENCOUNTER
New script sent for test strips. Patient recently switched to Medicare, new script was sent as requested

## 2025-03-10 NOTE — TELEPHONE ENCOUNTER
New prescription for test strips needs to follow Medicare guidelines, needs a diagnosis code and if patient is currently taking insulin.   Rx updated and sent to pharmacy.

## 2025-06-18 ENCOUNTER — LAB ENCOUNTER (OUTPATIENT)
Dept: LAB | Age: 65
End: 2025-06-18
Attending: FAMILY MEDICINE
Payer: MEDICARE

## 2025-06-18 ENCOUNTER — OFFICE VISIT (OUTPATIENT)
Dept: FAMILY MEDICINE CLINIC | Facility: CLINIC | Age: 65
End: 2025-06-18
Payer: MEDICARE

## 2025-06-18 VITALS
OXYGEN SATURATION: 98 % | HEIGHT: 61 IN | SYSTOLIC BLOOD PRESSURE: 138 MMHG | WEIGHT: 144 LBS | DIASTOLIC BLOOD PRESSURE: 78 MMHG | BODY MASS INDEX: 27.19 KG/M2 | HEART RATE: 88 BPM

## 2025-06-18 DIAGNOSIS — Z00.00 WELCOME TO MEDICARE PREVENTIVE VISIT: Primary | ICD-10-CM

## 2025-06-18 DIAGNOSIS — E83.51 HYPOCALCEMIA: ICD-10-CM

## 2025-06-18 DIAGNOSIS — E78.00 PURE HYPERCHOLESTEROLEMIA: ICD-10-CM

## 2025-06-18 DIAGNOSIS — F32.5 MAJOR DEPRESSIVE DISORDER WITH SINGLE EPISODE, IN FULL REMISSION: ICD-10-CM

## 2025-06-18 DIAGNOSIS — Z12.31 VISIT FOR SCREENING MAMMOGRAM: ICD-10-CM

## 2025-06-18 DIAGNOSIS — K21.9 GASTROESOPHAGEAL REFLUX DISEASE WITHOUT ESOPHAGITIS: ICD-10-CM

## 2025-06-18 DIAGNOSIS — E11.9 DIABETES MELLITUS WITHOUT COMPLICATION (HCC): ICD-10-CM

## 2025-06-18 DIAGNOSIS — Z86.0100 HISTORY OF COLONIC POLYPS: ICD-10-CM

## 2025-06-18 PROBLEM — E66.9 DIABETES MELLITUS TYPE 2 IN OBESE: Status: RESOLVED | Noted: 2017-12-19 | Resolved: 2025-06-18

## 2025-06-18 PROBLEM — N62 BREAST HYPERTROPHY IN FEMALE: Status: RESOLVED | Noted: 2018-03-30 | Resolved: 2025-06-18

## 2025-06-18 PROBLEM — R12 CHRONIC HEARTBURN: Status: RESOLVED | Noted: 2022-09-21 | Resolved: 2025-06-18

## 2025-06-18 PROBLEM — R13.10 DYSPHAGIA, UNSPECIFIED: Status: RESOLVED | Noted: 2022-09-21 | Resolved: 2025-06-18

## 2025-06-18 PROBLEM — E11.69 DIABETES MELLITUS TYPE 2 IN OBESE: Status: RESOLVED | Noted: 2017-12-19 | Resolved: 2025-06-18

## 2025-06-18 LAB
ATRIAL RATE: 77 BPM
CALCIUM BLD-MCNC: 9.2 MG/DL (ref 8.7–10.6)
CREAT UR-SCNC: 128.6 MG/DL
MICROALBUMIN UR-MCNC: 1.3 MG/DL
MICROALBUMIN/CREAT 24H UR-RTO: 10.1 UG/MG (ref ?–30)
P AXIS: 56 DEGREES
P-R INTERVAL: 156 MS
Q-T INTERVAL: 400 MS
QRS DURATION: 100 MS
QTC CALCULATION (BEZET): 452 MS
R AXIS: 20 DEGREES
T AXIS: 32 DEGREES
VENTRICULAR RATE: 77 BPM

## 2025-06-18 PROCEDURE — 82310 ASSAY OF CALCIUM: CPT

## 2025-06-18 PROCEDURE — G0403 EKG FOR INITIAL PREVENT EXAM: HCPCS | Performed by: FAMILY MEDICINE

## 2025-06-18 PROCEDURE — 36415 COLL VENOUS BLD VENIPUNCTURE: CPT

## 2025-06-18 PROCEDURE — G0402 INITIAL PREVENTIVE EXAM: HCPCS | Performed by: FAMILY MEDICINE

## 2025-06-18 PROCEDURE — 82043 UR ALBUMIN QUANTITATIVE: CPT

## 2025-06-18 PROCEDURE — 82570 ASSAY OF URINE CREATININE: CPT

## 2025-06-18 RX ORDER — EZETIMIBE 10 MG
10 TABLET ORAL NIGHTLY
COMMUNITY
Start: 2025-05-09

## 2025-06-18 NOTE — PROGRESS NOTES
REASON FOR VISIT:    Sandra Cross is a 64 year old female who presents for a Medicare Initial Preventative Physical exam.    HPI:     Patient Care Team: Patient Care Team:  Hui Francis MD as PCP - General (Family Practice)  Jason Saldivar MD (Obstetrics)  Romina Sewell MD (DERMATOLOGY)  Pollo Frye as Consulting Physician (OPTOMETRY)  Chadwick Pearson MD (GASTROENTEROLOGY)    Problem List[1]  Current Medications[2]        Latest Ref Rng & Units 3/4/2025    10:21 AM 9/16/2024    12:03 PM 3/5/2024     9:05 AM 8/30/2023     9:38 AM 6/20/2022    12:11 PM 4/21/2021    10:31 AM 8/5/2020     9:49 AM   Glucose and HbA1c   Glucose 70 - 99 mg/dL 103  99  88  115  138  130  128          Latest Ref Rng & Units 3/4/2025    10:21 AM 9/16/2024    12:03 PM 3/5/2024     9:05 AM 8/30/2023     9:38 AM 6/20/2022    12:11 PM 4/21/2021    10:31 AM 8/5/2020     9:49 AM   Cholesterol   Total Cholesterol <200 mg/dL 165  153  171  170  152  193  203    Triglycerides 30 - 149 mg/dL 68  60  73  95  129  110  87    HDL 40 - 59 mg/dL 46  56  53  45  56  51  48    LDL <100 mg/dL 106  85  104  107  73  120  138          Latest Ref Rng & Units 3/4/2025    10:21 AM 9/16/2024    12:03 PM 3/5/2024     9:05 AM 8/30/2023     9:38 AM 6/20/2022    12:11 PM 4/21/2021    10:31 AM 8/5/2020     9:49 AM   BUN and Cr   BUN 9 - 23 mg/dL 14  20  20  12  18  20  18    Creatinine 0.55 - 1.02 mg/dL 0.70  0.64  0.73  0.72  0.62  0.68  0.79          Latest Ref Rng & Units 3/4/2025    10:21 AM 9/16/2024    12:03 PM 3/5/2024     9:05 AM 8/30/2023     9:38 AM 6/20/2022    12:11 PM 4/21/2021    10:31 AM 8/5/2020     9:49 AM   AST and ALT   AST (SGOT) <34 U/L 17  20  15  12  13  15  20    ALT (SGPT) 10 - 49 U/L 12  14  20  24  25  25  42          Latest Ref Rng & Units 3/4/2025    10:21 AM 9/16/2024    12:03 PM 8/30/2023     9:38 AM 6/20/2022    12:11 PM 4/21/2021    10:31 AM 8/5/2020     9:49 AM 2/7/2019     7:05 AM   TSH and Free T4   TSH 0.550 - 4.780  uIU/mL 0.986  0.909  0.623  0.574  0.634  0.804  0.615         General Health      In the past six months, have you lost more than 10 pounds without trying?: (Patient-Rptd) 2 - No  Has your appetite been poor?: (Patient-Rptd) No  Type of Diet: (Patient-Rptd) Balanced  How does the patient maintain a good energy level?: (Patient-Rptd) Daily Walks  How would you describe your daily physical activity?: (Patient-Rptd) Light  How would you describe your current health state?: (Patient-Rptd) Good  How do you maintain positive mental well-being?: (Patient-Rptd) Social Interaction, Visiting Friends, Visiting Family  Have you had any immunizations at another office such as Influenza, Hepatitis B, Tetanus, or Pneumococcal?: (Patient-Rptd) No     Functional Ability     Bathing or Showering: (Patient-Rptd) Able without help  Toileting: (Patient-Rptd) Able without help  Dressing: (Patient-Rptd) Able without help  Eating: (Patient-Rptd) Able without help  Driving: (Patient-Rptd) Able without help  Preparing your meals: (Patient-Rptd) Able without help  Managing money/bills: (Patient-Rptd) Able without help  Taking medications as prescribed: (Patient-Rptd) Able without help  Are you able to afford your medications?: (Patient-Rptd) Yes  Hearing Problems?: (Patient-Rptd) No     Functional Status     Hearing Problems?: (Patient-Rptd) No  Vision Problems? : (Patient-Rptd) Yes  Difficulty walking?: (Patient-Rptd) No  Difficulty dressing or bathing?: (Patient-Rptd) No  Problems with daily activities? : (Patient-Rptd) No  Memory Problems?: (Patient-Rptd) No      Fall/Risk Assessment         Flow sheet        Depression Screening (PHQ-2/PHQ-9): Over the LAST 2 WEEKS       Flow sheet     Advance Directives     Do you have a healthcare power of ?: (Patient-Rptd) Yes  Do you have a living will?: (Patient-Rptd) Yes     Cognitive Assessment     What day of the week is this?: Correct  What month is it?: Correct  What year is it?:  Correct  Recall \"Ball\": Correct  Recall \"Flag\": Correct  Recall \"Tree\": Correct         PREVENTATIVE SERVICES   INDICATIONS AND SCHEDULE Internal Lab or Procedure   Diabetes Screening     HbgA1C   Annually Hemoglobin A1c (%)   Date Value   12/14/2012 5.8 (H)     HEMOGLOBIN A1c (% of total Hgb)   Date Value   11/04/2015 6.5 (H)     HgbA1C (%)   Date Value   03/04/2025 6.3 (H)       Fasting Blood Sugar (FSB)Annually Glucose (mg/dL)   Date Value   03/04/2025 103 (H)   12/14/2012 84     GLUCOSE (mg/dL)   Date Value   11/04/2015 93      Cardiovascular Disease Screening    LDL Annually LDL Cholesterol Calc (mg/dL)   Date Value   05/31/2013 138 (H)     LDL Cholesterol (mg/dL)   Date Value   03/04/2025 106 (H)     LDL-CHOLESTEROL (mg/dL (calc))   Date Value   11/04/2015 151 (H)       EKG - w/ Initial Preventative Physical Exam only, or if medically necessary yes   Colorectal Cancer Screening     Colonoscopy Screen every 10 years Health Maintenance   Topic Date Due    Colorectal Cancer Screening  09/21/2032       Flex Sigmoidoscopy Screen every 5 years No results found for this or any previous visit.    Fecal Occult Blood Annually No results found for: \"FOB\", \"OCCULTSTOOL\"   Glaucoma Screening     Ophthalmology Visit Annually yes   Immunizations     Zoster (Not covered by Medicare Part B) No orders found for this or any previous visit.     SPECIFIC DISEASE MONITORING Internal Lab or Procedure     Diabetes     HgbA1C  Annually Hemoglobin A1c (%)   Date Value   12/14/2012 5.8 (H)     HEMOGLOBIN A1c (% of total Hgb)   Date Value   11/04/2015 6.5 (H)     HgbA1C (%)   Date Value   03/04/2025 6.3 (H)       Creat/alb ratio  Annually CREATININE (mg/dL)   Date Value   11/04/2015 0.72     Creatinine (mg/dL)   Date Value   03/04/2025 0.70     Creatinine, Serum (mg/dL)   Date Value   12/14/2012 0.87       LDL  Annually LDL Cholesterol Calc (mg/dL)   Date Value   05/31/2013 138 (H)     LDL Cholesterol (mg/dL)   Date Value   03/04/2025  106 (H)     LDL-CHOLESTEROL (mg/dL (calc))   Date Value   11/04/2015 151 (H)       Dilated Eye exam  Annually     10/28/2023    11:35 AM   Data entered on:   Last Dilated Eye Exam 10/28/2023              ALLERGIES:   Allergies[3]  MEDICAL INFORMATION:   Past Medical History[4]   Past Surgical History[5]   Family History[6]  Immunization History      Immunization History  Administered            Date(s) Administered    Covid-19 Vaccine Pfizer 30 mcg/0.3 ml                          12/18/2020  01/08/2021  10/06/2021      Covid-19 Vaccine Pfizer Bivalent 30mcg/0.3mL                          10/12/2022      Covid-19 Vaccine Pfizer Alonso-Sucrose 30 mcg/0.3 ml                          07/01/2022      Influenza             10/06/2008  11/01/2010  11/01/2011                            10/26/2012  10/01/2014  10/21/2015                            10/13/2016  10/12/2017  10/06/2018                            10/08/2019  10/27/2020  09/16/2021                            10/12/2022      Moderna Covid-19 Vaccine 50mcg/0.5ml 12yrs+                          11/15/2024      Pneumococcal Conjugate PCV20                          10/23/2024      Pneumovax 23          12/19/2017      RSV, recombinant, RSVpreF, adjuvanted (Arexvy)                          10/23/2024      TD                    06/26/2006      TDAP                  03/31/2010 04/09/2018      Zoster Vaccine Recombinant Adjuvanted (Shingrix)                          11/01/2019 02/06/2020        SOCIAL HISTORY:   Short Social Hx on File[7]     REVIEW OF SYSTEMS:   GENERAL: feels well otherwise  SKIN: denies any unusual skin lesions  EYES: denies blurred vision or double vision  HEENT: denies nasal congestion, sinus pain or ST  LUNGS: denies shortness of breath with exertion  CARDIOVASCULAR: denies chest pain on exertion  GI: denies abdominal pain, denies heartburn  : denies dysuria, vaginal discharge or itching  MUSCULOSKELETAL: denies back pain  NEURO: denies  headaches  PSYCHE: denies depression or anxiety  HEMATOLOGIC: denies hx of anemia  ENDOCRINE: denies thyroid history  ALL/ASTHMA: denies hx of allergy or asthma    EXAM:   /78   Pulse 88   Ht 5' 1\" (1.549 m)   Wt 144 lb (65.3 kg)   SpO2 98%   BMI 27.21 kg/m²    >   BP Readings from Last 3 Encounters:   06/18/25 138/78   11/12/24 132/78   11/02/24 153/81     GENERAL: well developed, well nourished, in no apparent distress   SKIN: no rashes, no suspicious lesions  HEENT: atraumatic, normocephalic, ears and throat are clear                Hearing Assessed via: Whispered Voice  EYES: PERRLA, EOMI, conjunctiva are clear normal optic disc  NECK: supple, no adenopathy, no bruits  CHEST: no chest tenderness  BREAST:no masses, no discharge, no axillary lymphadenopathy   LUNGS: clear to auscultation  CARDIO: RRR without murmur  GI: good BS's, no masses, HSM or tenderness  : deferred  RECTAL: deferred  MUSCULOSKELETAL: back is not tender, FROM of the back  EXTREMITIES: no cyanosis, clubbing or edema  NEURO: Oriented times three, cranial nerves are intact, motor and sensory are grossly intact  FOOT: normal exam    ASSESSMENT AND OTHER RELEVANT CHRONIC CONDITIONS:   Sandra Cross is a 64 year old female who presents for a Medicare Assessment.       Welcome to Medicare preventive visit  -     ELECTROCARDIOGRAM, COMPLETE  ECG:  NSR, normal axis deviation, no ischemic changes.      Visit for screening mammogram  -     [facility] CATERINA 2D+3D SCREENING BILAT (CPT=77067/51393); Future    Hypocalcemia  -     Calcium [E]; Future    Diabetes mellitus without complication (HCC)  -     Microalb/Creat Ratio, Random Urine; Future  Normal labs, pt lost lots of weight.   low sugar, low salt and  lipid diet, exercise 3 times a week d/w the pt.      Major depressive disorder with single episode, in full remission: doing well, cont. Lexapro.    Pure hypercholesterolemia: doing well, sees Dr. Lobo, on Zetia and Atorvastatin.cont. healthy  diet.    History of colonic polyps: up to date with colonoscopy.    Gastroesophageal reflux disease without esophagitis: doing well, cont. Omeprazole     The patient indicates understanding of these issues and agrees to the plan.  The patient is asked to return in 1 year for AWV  Diet counseling perfomed  Exercise counseling perfomed    SUGGESTED VACCINATIONS - Influenza, Pneumococcal, Zoster, Tetanus   Influenza: No recommendations at this time  Pneumonia: No recommendations at this time             [1]   Patient Active Problem List  Diagnosis    Hyperlipidemia    Hashimoto's thyroiditis    Diabetes mellitus without complication (HCC)    Gastroesophageal reflux disease without esophagitis    Major depressive disorder with single episode, in full remission    History of colonic polyps   [2]   Current Outpatient Medications   Medication Sig Dispense Refill    ZETIA 10 MG Oral Tab Take 1 tablet (10 mg total) by mouth nightly.      Glucose Blood (ONETOUCH ULTRA) In Vitro Strip Test blood sugar once daily 100 strip 3    Tirzepatide (MOUNJARO) 2.5 MG/0.5ML Subcutaneous Solution Auto-injector Inject 2.5 mg into the skin once a week. 2 mL 11    atorvastatin 20 MG Oral Tab Take 1 tablet (20 mg total) by mouth 4 (four) times a week. 48 tablet 3    metFORMIN HCl 1000 MG Oral Tab Take 1 tablet (1,000 mg total) by mouth 2 (two) times daily with meals. 180 tablet 0    Omeprazole 40 MG Oral Capsule Delayed Release Take 1 capsule (40 mg total) by mouth daily. 90 capsule 3    ESCITALOPRAM 5 MG Oral Tab TAKE 1 TABLET (5 MG TOTAL) BY MOUTH DAILY. 90 tablet 3    estradiol (ESTRACE) 0.1 MG/GM Vaginal Cream Apply 0.5 gram vaginally 2 times per week. 42.5 g 3    ascorbic acid 500 MG Oral Tab Take 1 tablet (500 mg total) by mouth daily.      ONETOUCH DELICA LANCETS 33G Does not apply Misc Use to test blood glucose once daily 100 each 3    acetaminophen 500 MG Oral Tab Take 2 tablets (1,000 mg total) by mouth one time.      Blood Glucose  Monitoring Suppl (ONETOUCH ULTRA 2) w/Device Does not apply Kit Use to test blood glucose once daily 1 kit 0    VITAMIN E daily      MULTIVITAMINS OR TABS 1 TABLET DAILY      CALCIUM 1200 2532-9967 MG-UNIT OR CHEW 1 TABLET TID      FISH OIL 1000 MG OR CAPS 1 TABLETS TID      ASPIRIN 81 MG OR TABS 1 TABLET DAILY      SLOW FE OR 1 DAILY occasional     [3]   Allergies  Allergen Reactions    Msg [Monosodium Glutamate] Tightness in Throat     Not always    Bactrim [Sulfamethoxazole W/Trimethoprim] RASH     Full body rash   [4]   Past Medical History:   Abdominal distention    ACTINIC KERATOSIS/dysplastic nevi    Dr. Sewell follows.    Arthritis    Osteoarthritis    Belching    Body piercing    Ears    Decorative tattoo    R wrist    Depression    Diabetes mellitus (HCC)    Gestational diabetes too,     Esophageal reflux    Flatulence/gas pain/belching    GLUCOSE INTOLERANCE    HISTORY OF GESTATIONAL DIABETES    Hashimoto's thyroiditis    Headache disorder    Very occasionally    Hemorrhoids    High cholesterol    History of cardiac murmur    History of depression    Daughter  in a car accident    Internal hemorrhoids without mention of complication    MITRAL VALVE INSUFFICIENCY    OSTEOPENIA    Pain in joints    Postmenopausal atrophic vaginitis    Problems with swallowing    Intermittently feels like food is stuck, R side    Type II or unspecified type diabetes mellitus without mention of complication, not stated as uncontrolled    Type II or unspecified type diabetes mellitus without mention of complication, not stated as uncontrolled    Unspecified disorder of thyroid    nodule to be biopsied 5/1/15    Valvular disease    Visual impairment    glasses    Wears glasses    Glasses since college   [5]   Past Surgical History:  Procedure Laterality Date    Calcium score, cardio (dmg)  2/25/10    Calcium score was 4    Colonoscopy  ; 10/11    internal hemorrhoids    Colposcopy, cervix w/upper adjacent vagina;  w/biopsy(s), cervix      at age 24    Ct aspiration thyroid, guide incl, first lesion(cpt=10009)      Cyst aspiration left  2003    Dilate esophagus  10/11    Dr. Gloria    Hernia surgery  [age] years old    left inguinal  repair    Hernia surgery  6/05    umbilical hernia repair    Hysterectomy  6/05    MEENU-BSO    Lainey localization wire 1 site left (cpt=19281)      AGE 18 LIPOMA    Lainey localization wire 1 site left (cpt=19281)  02/2001    ADH    Needle biopsy left  2012    ADH    Oophorectomy  2005    Other      lipoma removed age 19    Other  8/05    rectocele, repair of prolapsed vagina    Other surgical history  6/05    bladder suspension    Other surgical history  8/09    removal of cyst - left hand    Other surgical history  1980    removal of fibroidenoma - lft axilla    Other surgical history  2005    tummy tuck    Reduction left  07/30/2018    Reduction right  07/30/2018    Stress echo test, cardio (dmg)  4/6/12    no ischemia    Total abdom hysterectomy  6/05    Upper gi endoscopy - referral  10/6/11    no malignancy, diffuse intramucosal eosinophils   [6]   Family History  Problem Relation Age of Onset    Hypertension Father     Diabetes Father         Type II    Stroke Father         X2 or 3, mild    Lipids Father     Thyroid Disorder Mother         hypothyroidism    Gastro-Intestinal Disorder Mother         ischemic colitis    Hypertension Mother     Diabetes Paternal Grandmother         Type II    Diabetes Sister         DM I    Thyroid Disorder Sister         hypothyroidism    Thyroid Disorder Sister         hypothyroid    Thyroid Disorder Sister         hypothryoidism    Arthritis Sister         Bilateral knee replacements    Diabetes Sister         Type II    Thyroid Disorder Brother         hypothyroidism    Lung Disorder Brother         chronic cough    Gastro-Intestinal Disorder Brother 55        celiac disease    Breast Cancer Paternal Aunt         dx age 60's    Breast Cancer Paternal Cousin  Female 60        approx age    Breast Cancer Paternal Cousin Female 55   [7]   Social History  Socioeconomic History    Marital status:     Number of children: 2   Occupational History    Occupation: x-ray technician     Employer: [employer]   Tobacco Use    Smoking status: Never    Smokeless tobacco: Never   Vaping Use    Vaping status: Never Used   Substance and Sexual Activity    Alcohol use: Yes     Alcohol/week: 2.0 standard drinks of alcohol     Types: 2 Standard drinks or equivalent per week     Comment: 2x month    Drug use: No    Sexual activity: Never     Partners: Male     Birth control/protection: Hysterectomy   Other Topics Concern    Caffeine Concern Yes     Comment: diet coke 1/day    Exercise No    Seat Belt Yes   Social History Narrative    . 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 Nonsmoker. No alcohol or illicits. Regular exercise. One living child.     Social Drivers of Health      Received from [facility]    Housing Stability

## 2025-07-24 NOTE — BRIEF OP NOTE
Pre-Operative Diagnosis: BILATERAL BREAST HYPERTROPHY       Post-Operative Diagnosis: BILATERAL BREAST HYPERTROPHY        Procedure Performed:   Procedure(s):  BILATERAL BREAST REDUCTION      Surgeon(s) and Role:     Lee Sánchez MD - Primary    Assi Yes - - -

## 2025-08-25 ENCOUNTER — LAB ENCOUNTER (OUTPATIENT)
Dept: LAB | Age: 65
End: 2025-08-25
Attending: FAMILY MEDICINE

## 2025-08-25 DIAGNOSIS — R06.09 DYSPNEA ON EXERTION: ICD-10-CM

## 2025-08-25 DIAGNOSIS — R53.83 FATIGUE: ICD-10-CM

## 2025-08-25 DIAGNOSIS — E11.69 DIABETES MELLITUS ASSOCIATED WITH HORMONAL ETIOLOGY (HCC): Primary | ICD-10-CM

## 2025-08-25 DIAGNOSIS — D50.9 IRON DEFICIENCY ANEMIA, UNSPECIFIED: ICD-10-CM

## 2025-08-25 DIAGNOSIS — E78.2 MIXED HYPERLIPIDEMIA: ICD-10-CM

## 2025-08-25 LAB
ALBUMIN SERPL-MCNC: 4.3 G/DL (ref 3.2–4.8)
ALBUMIN/GLOB SERPL: 1.9 (ref 1–2)
ALP LIVER SERPL-CCNC: 64 U/L (ref 50–130)
ALT SERPL-CCNC: 21 U/L (ref 10–49)
ANION GAP SERPL CALC-SCNC: 12 MMOL/L (ref 0–18)
AST SERPL-CCNC: 23 U/L (ref ?–34)
BASOPHILS # BLD AUTO: 0.04 X10(3) UL (ref 0–0.2)
BASOPHILS NFR BLD AUTO: 0.9 %
BILIRUB SERPL-MCNC: 0.5 MG/DL (ref 0.2–1.1)
BUN BLD-MCNC: 13 MG/DL (ref 9–23)
CALCIUM BLD-MCNC: 9.5 MG/DL (ref 8.7–10.6)
CHLORIDE SERPL-SCNC: 104 MMOL/L (ref 98–112)
CHOLEST SERPL-MCNC: 158 MG/DL (ref ?–200)
CO2 SERPL-SCNC: 27 MMOL/L (ref 21–32)
CREAT BLD-MCNC: 0.73 MG/DL (ref 0.55–1.02)
EGFRCR SERPLBLD CKD-EPI 2021: 91 ML/MIN/1.73M2 (ref 60–?)
EOSINOPHIL # BLD AUTO: 0.17 X10(3) UL (ref 0–0.7)
EOSINOPHIL NFR BLD AUTO: 3.6 %
ERYTHROCYTE [DISTWIDTH] IN BLOOD BY AUTOMATED COUNT: 13.5 %
EST. AVERAGE GLUCOSE BLD GHB EST-MCNC: 126 MG/DL (ref 68–126)
FASTING PATIENT LIPID ANSWER: YES
FASTING STATUS PATIENT QL REPORTED: YES
GLOBULIN PLAS-MCNC: 2.3 G/DL (ref 2–3.5)
GLUCOSE BLD-MCNC: 99 MG/DL (ref 70–99)
HBA1C MFR BLD: 6 % (ref ?–5.7)
HCT VFR BLD AUTO: 39.1 % (ref 35–48)
HDLC SERPL-MCNC: 50 MG/DL (ref 40–59)
HGB BLD-MCNC: 12.4 G/DL (ref 12–16)
IMM GRANULOCYTES # BLD AUTO: 0 X10(3) UL (ref 0–1)
IMM GRANULOCYTES NFR BLD: 0 %
LDLC SERPL CALC-MCNC: 92 MG/DL (ref ?–100)
LYMPHOCYTES # BLD AUTO: 1.55 X10(3) UL (ref 1–4)
LYMPHOCYTES NFR BLD AUTO: 33.1 %
MCH RBC QN AUTO: 30.4 PG (ref 26–34)
MCHC RBC AUTO-ENTMCNC: 31.7 G/DL (ref 31–37)
MCV RBC AUTO: 95.8 FL (ref 80–100)
MONOCYTES # BLD AUTO: 0.35 X10(3) UL (ref 0.1–1)
MONOCYTES NFR BLD AUTO: 7.5 %
NEUTROPHILS # BLD AUTO: 2.57 X10 (3) UL (ref 1.5–7.7)
NEUTROPHILS # BLD AUTO: 2.57 X10(3) UL (ref 1.5–7.7)
NEUTROPHILS NFR BLD AUTO: 54.9 %
NONHDLC SERPL-MCNC: 108 MG/DL (ref ?–130)
OSMOLALITY SERPL CALC.SUM OF ELEC: 296 MOSM/KG (ref 275–295)
PLATELET # BLD AUTO: 265 10(3)UL (ref 150–450)
POTASSIUM SERPL-SCNC: 4.2 MMOL/L (ref 3.5–5.1)
PROT SERPL-MCNC: 6.6 G/DL (ref 5.7–8.2)
RBC # BLD AUTO: 4.08 X10(6)UL (ref 3.8–5.3)
SODIUM SERPL-SCNC: 143 MMOL/L (ref 136–145)
TRIGL SERPL-MCNC: 84 MG/DL (ref 30–149)
TSI SER-ACNC: 1.05 UIU/ML (ref 0.55–4.78)
VLDLC SERPL CALC-MCNC: 14 MG/DL (ref 0–30)
WBC # BLD AUTO: 4.7 X10(3) UL (ref 4–11)

## 2025-08-25 PROCEDURE — 80061 LIPID PANEL: CPT

## 2025-08-25 PROCEDURE — 83036 HEMOGLOBIN GLYCOSYLATED A1C: CPT

## 2025-08-25 PROCEDURE — 36415 COLL VENOUS BLD VENIPUNCTURE: CPT

## 2025-08-25 PROCEDURE — 85025 COMPLETE CBC W/AUTO DIFF WBC: CPT

## 2025-08-25 PROCEDURE — 80053 COMPREHEN METABOLIC PANEL: CPT

## 2025-08-25 PROCEDURE — 84443 ASSAY THYROID STIM HORMONE: CPT

## (undated) DIAGNOSIS — Z20.822 SUSPECTED COVID-19 VIRUS INFECTION: Primary | ICD-10-CM

## (undated) DIAGNOSIS — Z00.00 ROUTINE GENERAL MEDICAL EXAMINATION AT A HEALTH CARE FACILITY: Primary | ICD-10-CM

## (undated) DIAGNOSIS — E11.9 DIABETES MELLITUS WITHOUT COMPLICATION (HCC): ICD-10-CM

## (undated) DIAGNOSIS — U07.1 COVID-19: Primary | ICD-10-CM

## (undated) DEVICE — DRAPE EQUIPMENT INTRATEMP THER

## (undated) DEVICE — PLASTIC BREAST CDS-LF: Brand: MEDLINE INDUSTRIES, INC.

## (undated) DEVICE — SUTURE MONOCRYL 3-0 PS-2

## (undated) DEVICE — 3M™ TEGADERM™ TRANSPARENT FILM DRESSING, 1626W, 4 IN X 4-3/4 IN (10 CM X 12 CM), 50 EACH/CARTON, 4 CARTON/CASE: Brand: 3M™ TEGADERM™

## (undated) DEVICE — SUTURE MONOCRYL 4-0 PS-2

## (undated) DEVICE — SUTURE PLAIN GUT 5-0 PC-1

## (undated) DEVICE — SUTURE SILK 2-0 SH

## (undated) DEVICE — 3M™ STERI-STRIP™ REINFORCED ADHESIVE SKIN CLOSURES, R1547, 1/2 IN X 4 IN (12 MM X 100 MM), 6 STRIPS/ENVELOPE: Brand: 3M™ STERI-STRIP™

## (undated) DEVICE — DRAIN BLAKE ROUND 15FR

## (undated) DEVICE — SUTURE ETHILON 5-0 PS-3

## (undated) DEVICE — DRAPE PACK CHEST & U BAR

## (undated) DEVICE — GLOVE SURG SENSICARE SZ 7-1/2

## (undated) DEVICE — DERMABOND LIQUID ADHESIVE

## (undated) DEVICE — BLADE ELECTRODE: Brand: EDGE

## (undated) DEVICE — SUTURE VICRYL 3-0 SH

## (undated) DEVICE — TIP CLEANER: Brand: VALLEYLAB

## (undated) DEVICE — DRAPE HALF 40X58 DYNJP2410

## (undated) DEVICE — KENDALL SCD EXPRESS SLEEVES, KNEE LENGTH, MEDIUM: Brand: KENDALL SCD

## (undated) DEVICE — SOL  .9 1000ML BTL

## (undated) DEVICE — #10 STERILE BLADE: Brand: POLYMER COATED BLADES

## (undated) DEVICE — BANDAGE ROLL,100% COTTON, 6 PLY, LARGE: Brand: KERLIX

## (undated) DEVICE — DALE POST SURGICAL BRA, X-LARGE, FITS B-D, 38"-44", 1 PER BOX.: Brand: DALE POST SURGICAL BRA

## (undated) DEVICE — #15 STERILE STAINLESS BLADE: Brand: STERILE STAINLESS BLADES

## (undated) DEVICE — SUTURE VICRYL 3-0

## (undated) DEVICE — LAPAROTOMY SPONGE - RF AND X-RAY DETECTABLE PRE-WASHED: Brand: SITUATE

## (undated) DEVICE — DRAIN RELIAVAC 100CC

## (undated) DEVICE — 3M™ STERI-STRIP™ REINFORCED ADHESIVE SKIN CLOSURES, R1548, 1 IN X 5 IN (25 MM X 125 MM), 4 STRIPS/ENVELOPE: Brand: 3M™ STERI-STRIP™

## (undated) DEVICE — 3M™ IOBAN™ 2 ANTIMICROBIAL INCISE DRAPE 6648EZ: Brand: IOBAN™ 2

## (undated) NOTE — LETTER
Date: 4/20/2022    Patient Name: Martin Drake          To Whom it may concern: The above patient was seen at the [facility] for treatment of a medical condition. This patient should be excused from attending work/school from 4/20-4/30/2022. The patient may return to work/school on 5/1/2022 .         Sincerely,    TOSHIA Simon

## (undated) NOTE — LETTER
08/07/19        [place] [id number]      Dear Sarwat Nathan,    [address] [facility] records indicate that you have outstanding lab work and or testing that was ordered for you and has not yet been completed:  Orders Placed This Encounter

## (undated) NOTE — LETTER
08/07/19        [place] [address]      Dear Monroe,    [facility] records indicate that you have outstanding lab work and or testing that was ordered for you and has not yet been completed:  Orders Placed This Encounter

## (undated) NOTE — LETTER
11/09/17        [facility]  [place] [address]      Dear Anu Knapp,    [facility] records indicate that you have outstanding lab work and or testing that was ordered for you and has not yet been completed:          Hemoglobin A1C [E]

## (undated) NOTE — LETTER
5/3/2022  Gabe Samson Senft  [address] [address]    We take each of our patient's health very seriously and the key to maintaining your health is an annual wellness physical.  Review of your medical records shows that it is time for your annual wellness exam.  Please contact my office at your earliest convenience to schedule this appointment at [phone]  Thank Basia Farah MD

## (undated) NOTE — LETTER
AUTHORIZATION FOR SURGICAL OPERATION OR OTHER PROCEDURE    1. I hereby authorize Dr. Benson Rothman, and [facility] staff assigned to my case to perform the following operation and/or procedure at the [facility]:    _______________________________________________________________________________________________    Cortisone Injection Left  Foot  _______________________________________________________________________________________________    2. My physician has explained the nature and purpose of the operation or other procedure, possible alternative methods of treatment, the risks involved, and the possibility of complication to me. I acknowledge that no guarantee has been made as to the result that may be obtained. 3.  I recognize that, during the course of this operation, or other procedure, unforseen conditions may necessitate additional or different procedure than those listed above. I, therefore, further authorize and request that the above named physician, his/her physician assistants or designees perform such procedures as are, in his/her professional opinion, necessary and desirable. 4.  Any tissue or organs removed in the operation or other procedure may be disposed of by and at the discretion of the [facility], [facility] and [facility] AT [facility]. 5.  I understand that in the event of a medical emergency, I will be transported by local paramedics to [facility] or other hospital emergency department. 6.  I certify that I have read and fully understand the above consent to operation and/or other procedure. 7.  I acknowledge that my physician has explained sedation/analgesia administration to me including the risks and benefits. I consent to the administration of sedation/analgesia as may be necessary or desirable in the judgement of my physician.     Witness signature: ___________________________________________________ Date:  ______/______/_____ Time:  ________ A. M.  P.M. Patient Name:  ______________________________________________________  (please print)      Patient signature:  ___________________________________________________             Relationship to Patient:           []  Parent    Responsible person                          []  Spouse  In case of minor or                    [] Other  _____________   Incompetent name:  __________________________________________________                               (please print)      _____________      Responsible person  In case of minor or  Incompetent signature:  _______________________________________________    Statement of Physician  My signature below affirms that prior to the time of the procedure, I have explained to the patient and/or his/her guardian, the risks and benefits involved in the proposed treatment and any reasonable alternative to the proposed treatment. I have also explained the risks and benefits involved in the refusal of the proposed treatment and have answered the patient's questions.                         Date:  ______/______/_______  Provider                      Signature:  __________________________________________________________       Time:  ___________ A.M    P.M.

## (undated) NOTE — MR AVS SNAPSHOT
[address] [address] [phone]               Thank you for choosing us for your health care visit with Gerardo Rucker MD.  We are glad to serve you and happy to provide you with this summary of Take by mouth.            Vitamin D 1000 units Caps   2000 IU daily           VITAMIN E   daily                   Health Goals discussed Today        Last Edited       Increase physical activity 6/17/2015  1:05 PM by Iron Marcelino RN     Goal Comments    Go